# Patient Record
Sex: FEMALE | Race: WHITE | Employment: OTHER | URBAN - METROPOLITAN AREA
[De-identification: names, ages, dates, MRNs, and addresses within clinical notes are randomized per-mention and may not be internally consistent; named-entity substitution may affect disease eponyms.]

---

## 2022-06-22 ENCOUNTER — APPOINTMENT (OUTPATIENT)
Dept: GENERAL RADIOLOGY | Age: 77
DRG: 481 | End: 2022-06-22
Payer: MEDICARE

## 2022-06-22 ENCOUNTER — HOSPITAL ENCOUNTER (INPATIENT)
Age: 77
LOS: 5 days | Discharge: ANOTHER HOSPITAL | DRG: 481 | End: 2022-06-27
Attending: EMERGENCY MEDICINE | Admitting: INTERNAL MEDICINE
Payer: MEDICARE

## 2022-06-22 DIAGNOSIS — S72.002A CLOSED FRACTURE OF LEFT HIP, INITIAL ENCOUNTER (HCC): Primary | ICD-10-CM

## 2022-06-22 LAB
ABSOLUTE EOS #: 0.24 K/UL (ref 0–0.44)
ABSOLUTE IMMATURE GRANULOCYTE: 0.02 K/UL (ref 0–0.3)
ABSOLUTE LYMPH #: 2.71 K/UL (ref 1.1–3.7)
ABSOLUTE MONO #: 0.78 K/UL (ref 0.1–1.2)
ALBUMIN SERPL-MCNC: 4.3 G/DL (ref 3.5–5.2)
ALP BLD-CCNC: 32 U/L (ref 35–104)
ALT SERPL-CCNC: 8 U/L (ref 5–33)
ANION GAP SERPL CALCULATED.3IONS-SCNC: 15 MMOL/L (ref 9–17)
AST SERPL-CCNC: 16 U/L
BASOPHILS # BLD: 1 % (ref 0–2)
BASOPHILS ABSOLUTE: 0.05 K/UL (ref 0–0.2)
BILIRUB SERPL-MCNC: 0.22 MG/DL (ref 0.3–1.2)
BUN BLDV-MCNC: 23 MG/DL (ref 8–23)
BUN/CREAT BLD: 24 (ref 9–20)
CALCIUM SERPL-MCNC: 9.3 MG/DL (ref 8.6–10.4)
CHLORIDE BLD-SCNC: 97 MMOL/L (ref 98–107)
CO2: 19 MMOL/L (ref 20–31)
CREAT SERPL-MCNC: 0.97 MG/DL (ref 0.5–0.9)
EOSINOPHILS RELATIVE PERCENT: 3 % (ref 1–4)
GFR AFRICAN AMERICAN: >60 ML/MIN
GFR NON-AFRICAN AMERICAN: 55 ML/MIN
GFR SERPL CREATININE-BSD FRML MDRD: ABNORMAL ML/MIN/{1.73_M2}
GLUCOSE BLD-MCNC: 110 MG/DL (ref 70–99)
HCT VFR BLD CALC: 33.5 % (ref 36.3–47.1)
HEMOGLOBIN: 10.7 G/DL (ref 11.9–15.1)
IMMATURE GRANULOCYTES: 0 %
LYMPHOCYTES # BLD: 36 % (ref 24–43)
MCH RBC QN AUTO: 31.5 PG (ref 25.2–33.5)
MCHC RBC AUTO-ENTMCNC: 31.9 G/DL (ref 28.4–34.8)
MCV RBC AUTO: 98.5 FL (ref 82.6–102.9)
MONOCYTES # BLD: 10 % (ref 3–12)
NRBC AUTOMATED: 0 PER 100 WBC
PDW BLD-RTO: 13.3 % (ref 11.8–14.4)
PLATELET # BLD: 246 K/UL (ref 138–453)
PMV BLD AUTO: 8.9 FL (ref 8.1–13.5)
POTASSIUM SERPL-SCNC: 3.7 MMOL/L (ref 3.7–5.3)
RBC # BLD: 3.4 M/UL (ref 3.95–5.11)
SEG NEUTROPHILS: 50 % (ref 36–65)
SEGMENTED NEUTROPHILS ABSOLUTE COUNT: 3.7 K/UL (ref 1.5–8.1)
SODIUM BLD-SCNC: 131 MMOL/L (ref 135–144)
TOTAL PROTEIN: 6.7 G/DL (ref 6.4–8.3)
WBC # BLD: 7.5 K/UL (ref 3.5–11.3)

## 2022-06-22 PROCEDURE — 93005 ELECTROCARDIOGRAM TRACING: CPT | Performed by: EMERGENCY MEDICINE

## 2022-06-22 PROCEDURE — 80053 COMPREHEN METABOLIC PANEL: CPT

## 2022-06-22 PROCEDURE — 85610 PROTHROMBIN TIME: CPT

## 2022-06-22 PROCEDURE — 6360000002 HC RX W HCPCS: Performed by: EMERGENCY MEDICINE

## 2022-06-22 PROCEDURE — 73502 X-RAY EXAM HIP UNI 2-3 VIEWS: CPT

## 2022-06-22 PROCEDURE — 73552 X-RAY EXAM OF FEMUR 2/>: CPT

## 2022-06-22 PROCEDURE — 86850 RBC ANTIBODY SCREEN: CPT

## 2022-06-22 PROCEDURE — 99285 EMERGENCY DEPT VISIT HI MDM: CPT

## 2022-06-22 PROCEDURE — 86901 BLOOD TYPING SEROLOGIC RH(D): CPT

## 2022-06-22 PROCEDURE — 85730 THROMBOPLASTIN TIME PARTIAL: CPT

## 2022-06-22 PROCEDURE — 1200000000 HC SEMI PRIVATE

## 2022-06-22 PROCEDURE — 85025 COMPLETE CBC W/AUTO DIFF WBC: CPT

## 2022-06-22 PROCEDURE — 86900 BLOOD TYPING SEROLOGIC ABO: CPT

## 2022-06-22 PROCEDURE — 96374 THER/PROPH/DIAG INJ IV PUSH: CPT

## 2022-06-22 PROCEDURE — 71045 X-RAY EXAM CHEST 1 VIEW: CPT

## 2022-06-22 RX ORDER — MORPHINE SULFATE 2 MG/ML
2 INJECTION, SOLUTION INTRAMUSCULAR; INTRAVENOUS ONCE
Status: COMPLETED | OUTPATIENT
Start: 2022-06-22 | End: 2022-06-22

## 2022-06-22 RX ADMIN — MORPHINE SULFATE 2 MG: 2 INJECTION, SOLUTION INTRAMUSCULAR; INTRAVENOUS at 22:52

## 2022-06-22 ASSESSMENT — PAIN - FUNCTIONAL ASSESSMENT: PAIN_FUNCTIONAL_ASSESSMENT: 0-10

## 2022-06-22 ASSESSMENT — PAIN DESCRIPTION - LOCATION
LOCATION: HIP
LOCATION: HIP

## 2022-06-22 ASSESSMENT — PAIN SCALES - GENERAL
PAINLEVEL_OUTOF10: 10
PAINLEVEL_OUTOF10: 10

## 2022-06-22 ASSESSMENT — PAIN DESCRIPTION - ORIENTATION
ORIENTATION: LEFT
ORIENTATION: LEFT

## 2022-06-22 ASSESSMENT — ENCOUNTER SYMPTOMS: BACK PAIN: 0

## 2022-06-23 ENCOUNTER — ANESTHESIA EVENT (OUTPATIENT)
Dept: OPERATING ROOM | Age: 77
DRG: 481 | End: 2022-06-23
Payer: MEDICARE

## 2022-06-23 PROBLEM — E46 MALNUTRITION (HCC): Status: ACTIVE | Noted: 2018-05-23

## 2022-06-23 PROBLEM — T88.59XA NAUSEA AFTER ANESTHESIA: Status: ACTIVE | Noted: 2018-05-19

## 2022-06-23 PROBLEM — R11.0 NAUSEA AFTER ANESTHESIA: Status: ACTIVE | Noted: 2018-05-19

## 2022-06-23 PROBLEM — K57.30 DIVERTICULOSIS OF LARGE INTESTINE: Status: ACTIVE | Noted: 2018-05-19

## 2022-06-23 PROBLEM — F10.20 ALCOHOLISM (HCC): Status: ACTIVE | Noted: 2018-05-23

## 2022-06-23 PROBLEM — H54.7 VISION IMPAIRMENT: Status: ACTIVE | Noted: 2022-06-23

## 2022-06-23 PROBLEM — S32.009A CLOSED FRACTURE OF TRANSVERSE PROCESS OF LUMBAR VERTEBRA (HCC): Status: ACTIVE | Noted: 2021-10-04

## 2022-06-23 PROBLEM — I44.0 FIRST DEGREE AV BLOCK: Status: ACTIVE | Noted: 2022-06-23

## 2022-06-23 PROBLEM — E78.2 MIXED HYPERLIPIDEMIA: Status: ACTIVE | Noted: 2017-05-06

## 2022-06-23 PROBLEM — F32.A DEPRESSION: Status: ACTIVE | Noted: 2019-04-01

## 2022-06-23 PROBLEM — K65.1 INTRA-ABDOMINAL ABSCESS (HCC): Status: ACTIVE | Noted: 2017-01-16

## 2022-06-23 PROBLEM — D50.8 IRON DEFICIENCY ANEMIA SECONDARY TO INADEQUATE DIETARY IRON INTAKE: Status: ACTIVE | Noted: 2017-04-14

## 2022-06-23 PROBLEM — J44.9 COPD, MILD (HCC): Status: ACTIVE | Noted: 2017-05-06

## 2022-06-23 PROBLEM — I48.19 PERSISTENT ATRIAL FIBRILLATION (HCC): Status: ACTIVE | Noted: 2019-04-01

## 2022-06-23 PROBLEM — I67.9 CEREBROVASCULAR DISEASE: Status: ACTIVE | Noted: 2018-02-15

## 2022-06-23 PROBLEM — D51.9 B12 DEFICIENCY ANEMIA: Status: ACTIVE | Noted: 2017-05-11

## 2022-06-23 PROBLEM — Z79.01 CHRONIC ANTICOAGULATION: Status: ACTIVE | Noted: 2022-06-23

## 2022-06-23 PROBLEM — E87.1 HYPONATREMIA: Status: ACTIVE | Noted: 2017-04-30

## 2022-06-23 LAB
ABO/RH: NORMAL
ANION GAP SERPL CALCULATED.3IONS-SCNC: 11 MMOL/L (ref 9–17)
ANTIBODY SCREEN: NEGATIVE
ARM BAND NUMBER: NORMAL
BUN BLDV-MCNC: 16 MG/DL (ref 8–23)
BUN/CREAT BLD: 21 (ref 9–20)
CALCIUM SERPL-MCNC: 8.9 MG/DL (ref 8.6–10.4)
CHLORIDE BLD-SCNC: 99 MMOL/L (ref 98–107)
CO2: 21 MMOL/L (ref 20–31)
CREAT SERPL-MCNC: 0.76 MG/DL (ref 0.5–0.9)
EXPIRATION DATE: NORMAL
GFR AFRICAN AMERICAN: >60 ML/MIN
GFR NON-AFRICAN AMERICAN: >60 ML/MIN
GFR SERPL CREATININE-BSD FRML MDRD: ABNORMAL ML/MIN/{1.73_M2}
GLUCOSE BLD-MCNC: 113 MG/DL (ref 70–99)
INR BLD: 1.1
INR BLD: 1.2
PARTIAL THROMBOPLASTIN TIME: 30.7 SEC (ref 23.9–33.8)
POTASSIUM SERPL-SCNC: 4.3 MMOL/L (ref 3.7–5.3)
PROTHROMBIN TIME: 14.5 SEC (ref 11.5–14.2)
PROTHROMBIN TIME: 14.7 SEC (ref 11.5–14.2)
SODIUM BLD-SCNC: 131 MMOL/L (ref 135–144)

## 2022-06-23 PROCEDURE — 36415 COLL VENOUS BLD VENIPUNCTURE: CPT

## 2022-06-23 PROCEDURE — 6370000000 HC RX 637 (ALT 250 FOR IP): Performed by: NURSE PRACTITIONER

## 2022-06-23 PROCEDURE — 2580000003 HC RX 258: Performed by: NURSE PRACTITIONER

## 2022-06-23 PROCEDURE — 85610 PROTHROMBIN TIME: CPT

## 2022-06-23 PROCEDURE — 1200000000 HC SEMI PRIVATE

## 2022-06-23 PROCEDURE — 80048 BASIC METABOLIC PNL TOTAL CA: CPT

## 2022-06-23 PROCEDURE — 6360000002 HC RX W HCPCS: Performed by: NURSE PRACTITIONER

## 2022-06-23 PROCEDURE — 99222 1ST HOSP IP/OBS MODERATE 55: CPT | Performed by: INTERNAL MEDICINE

## 2022-06-23 PROCEDURE — APPSS45 APP SPLIT SHARED TIME 31-45 MINUTES: Performed by: NURSE PRACTITIONER

## 2022-06-23 RX ORDER — LORAZEPAM 2 MG/ML
2 INJECTION INTRAMUSCULAR
Status: DISCONTINUED | OUTPATIENT
Start: 2022-06-23 | End: 2022-06-23

## 2022-06-23 RX ORDER — APIXABAN 5 MG/1
5 TABLET, FILM COATED ORAL 2 TIMES DAILY
COMMUNITY
Start: 2022-05-30

## 2022-06-23 RX ORDER — LORAZEPAM 1 MG/1
3 TABLET ORAL
Status: DISCONTINUED | OUTPATIENT
Start: 2022-06-23 | End: 2022-06-23

## 2022-06-23 RX ORDER — ACETAMINOPHEN 500 MG
500-1000 TABLET ORAL EVERY 6 HOURS PRN
COMMUNITY

## 2022-06-23 RX ORDER — LORAZEPAM 2 MG/ML
3 INJECTION INTRAMUSCULAR
Status: DISCONTINUED | OUTPATIENT
Start: 2022-06-23 | End: 2022-06-23

## 2022-06-23 RX ORDER — ASPIRIN 81 MG/1
81 TABLET ORAL DAILY
COMMUNITY

## 2022-06-23 RX ORDER — MAGNESIUM SULFATE 1 G/100ML
1000 INJECTION INTRAVENOUS PRN
Status: DISCONTINUED | OUTPATIENT
Start: 2022-06-23 | End: 2022-06-27 | Stop reason: HOSPADM

## 2022-06-23 RX ORDER — PSEUDOEPHEDRINE HCL 30 MG
100 TABLET ORAL DAILY PRN
COMMUNITY

## 2022-06-23 RX ORDER — ACETAMINOPHEN 650 MG/1
650 SUPPOSITORY RECTAL EVERY 6 HOURS PRN
Status: DISCONTINUED | OUTPATIENT
Start: 2022-06-23 | End: 2022-06-27 | Stop reason: HOSPADM

## 2022-06-23 RX ORDER — DOCUSATE SODIUM 100 MG/1
100 CAPSULE, LIQUID FILLED ORAL 2 TIMES DAILY
Status: DISCONTINUED | OUTPATIENT
Start: 2022-06-23 | End: 2022-06-27 | Stop reason: HOSPADM

## 2022-06-23 RX ORDER — SODIUM CHLORIDE 9 MG/ML
INJECTION, SOLUTION INTRAVENOUS CONTINUOUS
Status: DISCONTINUED | OUTPATIENT
Start: 2022-06-23 | End: 2022-06-26

## 2022-06-23 RX ORDER — POLYETHYLENE GLYCOL 3350 17 G/17G
17 POWDER, FOR SOLUTION ORAL DAILY
Status: DISCONTINUED | OUTPATIENT
Start: 2022-06-23 | End: 2022-06-27 | Stop reason: HOSPADM

## 2022-06-23 RX ORDER — MORPHINE SULFATE 2 MG/ML
2 INJECTION, SOLUTION INTRAMUSCULAR; INTRAVENOUS ONCE
Status: DISCONTINUED | OUTPATIENT
Start: 2022-06-23 | End: 2022-06-24

## 2022-06-23 RX ORDER — FOLIC ACID 1 MG/1
1 TABLET ORAL DAILY
Status: DISCONTINUED | OUTPATIENT
Start: 2022-06-23 | End: 2022-06-27 | Stop reason: HOSPADM

## 2022-06-23 RX ORDER — MORPHINE SULFATE 2 MG/ML
2 INJECTION, SOLUTION INTRAMUSCULAR; INTRAVENOUS
Status: DISCONTINUED | OUTPATIENT
Start: 2022-06-23 | End: 2022-06-24

## 2022-06-23 RX ORDER — SODIUM CHLORIDE 0.9 % (FLUSH) 0.9 %
5-40 SYRINGE (ML) INJECTION EVERY 12 HOURS SCHEDULED
Status: DISCONTINUED | OUTPATIENT
Start: 2022-06-23 | End: 2022-06-23 | Stop reason: SDUPTHER

## 2022-06-23 RX ORDER — FENOFIBRATE 145 MG/1
145 TABLET, COATED ORAL DAILY
COMMUNITY
Start: 2022-05-13

## 2022-06-23 RX ORDER — POLYETHYLENE GLYCOL 3350 17 G/17G
17 POWDER, FOR SOLUTION ORAL DAILY PRN
Status: DISCONTINUED | OUTPATIENT
Start: 2022-06-23 | End: 2022-06-23

## 2022-06-23 RX ORDER — LORAZEPAM 1 MG/1
2 TABLET ORAL
Status: DISCONTINUED | OUTPATIENT
Start: 2022-06-23 | End: 2022-06-23

## 2022-06-23 RX ORDER — ALBUTEROL SULFATE 90 UG/1
2 AEROSOL, METERED RESPIRATORY (INHALATION) EVERY 6 HOURS PRN
COMMUNITY

## 2022-06-23 RX ORDER — HYDROCODONE BITARTRATE AND ACETAMINOPHEN 5; 325 MG/1; MG/1
1 TABLET ORAL EVERY 4 HOURS PRN
Status: DISCONTINUED | OUTPATIENT
Start: 2022-06-23 | End: 2022-06-24

## 2022-06-23 RX ORDER — ONDANSETRON 4 MG/1
4 TABLET, ORALLY DISINTEGRATING ORAL EVERY 8 HOURS PRN
Status: DISCONTINUED | OUTPATIENT
Start: 2022-06-23 | End: 2022-06-27 | Stop reason: HOSPADM

## 2022-06-23 RX ORDER — METOPROLOL SUCCINATE 50 MG/1
50 TABLET, EXTENDED RELEASE ORAL DAILY
COMMUNITY

## 2022-06-23 RX ORDER — OMEPRAZOLE 20 MG/1
20 CAPSULE, DELAYED RELEASE ORAL DAILY
COMMUNITY

## 2022-06-23 RX ORDER — SODIUM CHLORIDE 0.9 % (FLUSH) 0.9 %
10 SYRINGE (ML) INJECTION PRN
Status: DISCONTINUED | OUTPATIENT
Start: 2022-06-23 | End: 2022-06-23 | Stop reason: SDUPTHER

## 2022-06-23 RX ORDER — ENOXAPARIN SODIUM 100 MG/ML
40 INJECTION SUBCUTANEOUS DAILY
Status: DISCONTINUED | OUTPATIENT
Start: 2022-06-23 | End: 2022-06-23

## 2022-06-23 RX ORDER — LORAZEPAM 1 MG/1
4 TABLET ORAL
Status: DISCONTINUED | OUTPATIENT
Start: 2022-06-23 | End: 2022-06-23

## 2022-06-23 RX ORDER — GAUZE BANDAGE 2" X 2"
100 BANDAGE TOPICAL DAILY
Status: DISCONTINUED | OUTPATIENT
Start: 2022-06-23 | End: 2022-06-27 | Stop reason: HOSPADM

## 2022-06-23 RX ORDER — CYANOCOBALAMIN 1000 UG/ML
1000 INJECTION INTRAMUSCULAR; SUBCUTANEOUS
COMMUNITY
Start: 2022-03-25

## 2022-06-23 RX ORDER — SODIUM CHLORIDE 0.9 % (FLUSH) 0.9 %
5-40 SYRINGE (ML) INJECTION PRN
Status: DISCONTINUED | OUTPATIENT
Start: 2022-06-23 | End: 2022-06-24 | Stop reason: SDUPTHER

## 2022-06-23 RX ORDER — POTASSIUM CHLORIDE 20 MEQ/1
40 TABLET, EXTENDED RELEASE ORAL PRN
Status: DISCONTINUED | OUTPATIENT
Start: 2022-06-23 | End: 2022-06-27 | Stop reason: HOSPADM

## 2022-06-23 RX ORDER — SODIUM CHLORIDE 0.9 % (FLUSH) 0.9 %
5-40 SYRINGE (ML) INJECTION EVERY 12 HOURS SCHEDULED
Status: DISCONTINUED | OUTPATIENT
Start: 2022-06-23 | End: 2022-06-24 | Stop reason: SDUPTHER

## 2022-06-23 RX ORDER — LOVASTATIN 40 MG/1
40 TABLET ORAL NIGHTLY
COMMUNITY
Start: 2022-04-30

## 2022-06-23 RX ORDER — MORPHINE SULFATE 4 MG/ML
4 INJECTION, SOLUTION INTRAMUSCULAR; INTRAVENOUS
Status: DISCONTINUED | OUTPATIENT
Start: 2022-06-23 | End: 2022-06-24

## 2022-06-23 RX ORDER — POTASSIUM CHLORIDE 7.45 MG/ML
10 INJECTION INTRAVENOUS PRN
Status: DISCONTINUED | OUTPATIENT
Start: 2022-06-23 | End: 2022-06-27 | Stop reason: HOSPADM

## 2022-06-23 RX ORDER — SODIUM CHLORIDE 9 MG/ML
INJECTION, SOLUTION INTRAVENOUS PRN
Status: DISCONTINUED | OUTPATIENT
Start: 2022-06-23 | End: 2022-06-23 | Stop reason: SDUPTHER

## 2022-06-23 RX ORDER — SODIUM CHLORIDE 9 MG/ML
INJECTION, SOLUTION INTRAVENOUS PRN
Status: DISCONTINUED | OUTPATIENT
Start: 2022-06-23 | End: 2022-06-24 | Stop reason: SDUPTHER

## 2022-06-23 RX ORDER — LORAZEPAM 2 MG/ML
4 INJECTION INTRAMUSCULAR
Status: DISCONTINUED | OUTPATIENT
Start: 2022-06-23 | End: 2022-06-23

## 2022-06-23 RX ORDER — LORAZEPAM 1 MG/1
1 TABLET ORAL
Status: DISCONTINUED | OUTPATIENT
Start: 2022-06-23 | End: 2022-06-23

## 2022-06-23 RX ORDER — CITALOPRAM 20 MG/1
20 TABLET ORAL NIGHTLY
COMMUNITY
Start: 2022-05-30

## 2022-06-23 RX ORDER — LORAZEPAM 2 MG/ML
1 INJECTION INTRAMUSCULAR
Status: DISCONTINUED | OUTPATIENT
Start: 2022-06-23 | End: 2022-06-23

## 2022-06-23 RX ORDER — ONDANSETRON 2 MG/ML
4 INJECTION INTRAMUSCULAR; INTRAVENOUS EVERY 6 HOURS PRN
Status: DISCONTINUED | OUTPATIENT
Start: 2022-06-23 | End: 2022-06-27 | Stop reason: HOSPADM

## 2022-06-23 RX ORDER — ACETAMINOPHEN 325 MG/1
650 TABLET ORAL EVERY 6 HOURS PRN
Status: DISCONTINUED | OUTPATIENT
Start: 2022-06-23 | End: 2022-06-27 | Stop reason: HOSPADM

## 2022-06-23 RX ORDER — MAGNESIUM OXIDE 400 MG/1
400 TABLET ORAL DAILY
COMMUNITY

## 2022-06-23 RX ADMIN — HYDROCODONE BITARTRATE AND ACETAMINOPHEN 1 TABLET: 5; 325 TABLET ORAL at 08:22

## 2022-06-23 RX ADMIN — HYDROCODONE BITARTRATE AND ACETAMINOPHEN 1 TABLET: 5; 325 TABLET ORAL at 23:10

## 2022-06-23 RX ADMIN — MORPHINE SULFATE 2 MG: 2 INJECTION, SOLUTION INTRAMUSCULAR; INTRAVENOUS at 10:22

## 2022-06-23 RX ADMIN — SODIUM CHLORIDE: 9 INJECTION, SOLUTION INTRAVENOUS at 13:22

## 2022-06-23 RX ADMIN — MORPHINE SULFATE 2 MG: 2 INJECTION, SOLUTION INTRAMUSCULAR; INTRAVENOUS at 01:19

## 2022-06-23 RX ADMIN — MORPHINE SULFATE 4 MG: 4 INJECTION, SOLUTION INTRAMUSCULAR; INTRAVENOUS at 20:06

## 2022-06-23 RX ADMIN — DOCUSATE SODIUM 100 MG: 100 CAPSULE, LIQUID FILLED ORAL at 20:07

## 2022-06-23 RX ADMIN — HYDROCODONE BITARTRATE AND ACETAMINOPHEN 1 TABLET: 5; 325 TABLET ORAL at 18:08

## 2022-06-23 RX ADMIN — SODIUM CHLORIDE: 9 INJECTION, SOLUTION INTRAVENOUS at 01:19

## 2022-06-23 RX ADMIN — MORPHINE SULFATE 2 MG: 2 INJECTION, SOLUTION INTRAMUSCULAR; INTRAVENOUS at 05:36

## 2022-06-23 RX ADMIN — HYDROCODONE BITARTRATE AND ACETAMINOPHEN 1 TABLET: 5; 325 TABLET ORAL at 03:50

## 2022-06-23 ASSESSMENT — ENCOUNTER SYMPTOMS
BACK PAIN: 0
EYES NEGATIVE: 1
RESPIRATORY NEGATIVE: 1
GASTROINTESTINAL NEGATIVE: 1

## 2022-06-23 ASSESSMENT — PAIN DESCRIPTION - DESCRIPTORS
DESCRIPTORS: SHARP;DISCOMFORT;ACHING
DESCRIPTORS: ACHING;DISCOMFORT

## 2022-06-23 ASSESSMENT — PAIN SCALES - GENERAL
PAINLEVEL_OUTOF10: 10
PAINLEVEL_OUTOF10: 6
PAINLEVEL_OUTOF10: 7
PAINLEVEL_OUTOF10: 7
PAINLEVEL_OUTOF10: 10
PAINLEVEL_OUTOF10: 10
PAINLEVEL_OUTOF10: 5
PAINLEVEL_OUTOF10: 6
PAINLEVEL_OUTOF10: 7

## 2022-06-23 ASSESSMENT — PAIN DESCRIPTION - FREQUENCY
FREQUENCY: CONTINUOUS
FREQUENCY: CONTINUOUS

## 2022-06-23 ASSESSMENT — PAIN - FUNCTIONAL ASSESSMENT: PAIN_FUNCTIONAL_ASSESSMENT: PREVENTS OR INTERFERES WITH MANY ACTIVE NOT PASSIVE ACTIVITIES

## 2022-06-23 ASSESSMENT — PAIN DESCRIPTION - ONSET
ONSET: ON-GOING
ONSET: ON-GOING

## 2022-06-23 ASSESSMENT — PAIN DESCRIPTION - LOCATION
LOCATION: HIP

## 2022-06-23 ASSESSMENT — PAIN DESCRIPTION - ORIENTATION
ORIENTATION: LEFT

## 2022-06-23 ASSESSMENT — PAIN DESCRIPTION - PAIN TYPE
TYPE: ACUTE PAIN
TYPE: ACUTE PAIN

## 2022-06-23 NOTE — FLOWSHEET NOTE
Patient is very pleasant as she reclines in the bed. Patient engages in conversation and shares about her fall that led to a fractured hip. Patient states that she is in some discomfort and is awaiting surgery. Patient states that she has a daughter for support but the daughter is out of town at the moment. Patient seems to be coping adequately and does not acknowledge any spiritual or emotional concerns. Writer leaves a prayer card and business care for possible further follow. Patient is very appreciative of the visit. Spiritual Care will follow as needed. 06/23/22 1610   Encounter Summary   Service Provided For: Patient   Referral/Consult From: [facility] [place]   [facility]   Last Encounter  06/23/22   Complexity of Encounter Low   Begin Time 1515   End Time  1530   Total Time Calculated 15 min   Encounter    Type Initial Screen/Assessment   Assessment/Intervention/Outcome   Assessment Calm; Hopeful   Intervention Active listening;Explored/Affirmed feelings, thoughts, concerns;Explored Coping Skills/Resources;Nurtured Hope;Sustaining Presence/Ministry of presence

## 2022-06-23 NOTE — H&P
[facility]  Office: [address], DO, Mya Press, DO, Gayathri Kowalskiing, DO, Rafaelabram Gracefreda Blood, DO, Rocío Salmeron MD, Amy Pires MD, Miranda Maciel MD, Ada Loredo MD, Anabela Del Valle MD, Sadie Solo MD, Tarsha Huynh MD, Taylor Espinosa, DO, Tomi Samson MD,  Gena Siemens, DO, Quita Cheatham MD, Lakisha Medina MD, Angela Goodwin, DO, Koko Fraga MD, Cherelle Bauer MD, Antwon Cooper DO, Magalie Ya MD, Juice Glass MD, Erica Cruz [facility], [facility], CNP, Hayde Laurent, [facility], Xi Blocker, CNP, Markos Meyer, CNP, Shazia Baker, CNP, Roylene Goodell, PA-C, Tiffanie Ramon, [facility], Sumaya Vitale, CNP, Lynnette Leon, CNP, Mily Chambers, CNP, Marvin Flannery, CNS, Constantine Pollard, [facility], Kassie Headley, CNP, Viky Boyer, CNP, Vin Pro, [facility]    HISTORY AND PHYSICAL EXAMINATION            Date:   6/23/2022  Patient name:  Seda Walters  Date of admission:  6/22/2022 10:28 PM  MRN:   [id number]  Account:  [de-identified]  YOB: 1945  PCP:    No primary care provider on file. Room:   [facility] ABIEL/ABIEL  Code Status:    Full Code    Chief Complaint:     Chief Complaint   Patient presents with    Fall    Hip Pain       History Obtained From:     patient    History of Present Illness:     Seda Walters is a 76 y.o. Non- / non  female who presents with Fall and Hip Pain   and is admitted to the hospital for the management of Closed left hip fracture, initial encounter ([address] Patient reports she uses a walker and has for many years. She was standing in the small space when she twisted to move, lost her balance and fell onto her left side. She denies striking her head. Denies syncope or dizziness prior to falling. She has a PMH of HTN, atrial fib with Eliquis for anticoagulation, every other day ETOH consumption.      While in ED, xray of the left femur showed impacted, angulated intertrochanteric femur fracture. Orthopedic surgery was consulted and patient was admitted for further management of left hip fracture. Past Medical History:     Past Medical History:   Diagnosis Date    Atrial fibrillation ([address]     HTN (hypertension)         Past Surgical History:     Past Surgical History:   Procedure Laterality Date    APPENDECTOMY          Medications Prior to Admission:     Prior to Admission medications    Not on File        Allergies:     Sulfamethoxazole-trimethoprim    Social History:     Tobacco:    reports that she quit smoking about 39 years ago. She has never used smokeless tobacco.  Alcohol:      reports current alcohol use. Drug Use:  reports no history of drug use. Family History:     Family History   Problem Relation Age of Onset    Heart Disease Mother     Cancer Father     Hypertension Sister     Stroke Sister        Review of Systems:     Positive and Negative as described in HPI. Review of Systems   Constitutional: Negative. HENT: Negative. Eyes: Negative. Respiratory: Negative. Cardiovascular: Negative. Gastrointestinal: Negative. Genitourinary: Negative. Musculoskeletal: Positive for gait problem and myalgias. Negative for arthralgias and back pain. Skin: Negative. Neurological: Negative for dizziness, syncope, weakness, light-headedness, numbness and headaches. Psychiatric/Behavioral: Negative. Physical Exam:   BP (!) 131/55   Pulse 65   Temp 97.9 °F (36.6 °C) (Oral)   Resp 14   Ht 5' 6\" (1.676 m)   Wt 145 lb (65.8 kg)   SpO2 95%   BMI 23.40 kg/m²   Temp (24hrs), Av.9 °F (36.6 °C), Min:97.9 °F (36.6 °C), Max:97.9 °F (36.6 °C)    No results for input(s): POCGLU in the last 72 hours. No intake or output data in the 24 hours ending 22 0745    Physical Exam  Vitals and nursing note reviewed. Constitutional:       General: She is in acute distress.       Appearance: She is not ill-appearing, toxic-appearing or diaphoretic. HENT:      Head: Normocephalic and atraumatic. Right Ear: External ear normal.      Left Ear: External ear normal.      Nose: Nose normal. No rhinorrhea. Mouth/Throat:      Mouth: Mucous membranes are moist.   Eyes:      General: No scleral icterus. Right eye: No discharge. Left eye: No discharge. Extraocular Movements: Extraocular movements intact. Conjunctiva/sclera: Conjunctivae normal.      Pupils: Pupils are equal, round, and reactive to light. Neck:      Comments: No JVD  Cardiovascular:      Rate and Rhythm: Normal rate and regular rhythm. Pulses: Normal pulses. Heart sounds: Normal heart sounds. No murmur heard. No friction rub. No gallop. Pulmonary:      Effort: Pulmonary effort is normal. No respiratory distress. Breath sounds: Normal breath sounds. No wheezing, rhonchi or rales. Abdominal:      General: Bowel sounds are normal. There is no distension. Palpations: Abdomen is soft. Tenderness: There is no abdominal tenderness. There is no guarding. Hernia: No hernia is present. Musculoskeletal:      Cervical back: Normal range of motion and neck supple. Right lower leg: No edema. Left lower leg: No edema. Comments: Not able to move left leg, but able to wiggle toes on left    Skin:     General: Skin is warm and dry. Coloration: Skin is not jaundiced. Findings: No bruising, erythema or lesion. Neurological:      General: No focal deficit present. Mental Status: She is alert and oriented to person, place, and time. Psychiatric:         Mood and Affect: Mood normal.         Behavior: Behavior normal.         Thought Content:  Thought content normal.         Judgment: Judgment normal.         Investigations:      Laboratory Testing:  Recent Results (from the past 24 hour(s))   CBC with Auto Differential    Collection Time: 06/22/22 10:30 PM   Result Value Ref Range    WBC 7.5 3.5 - 11.3 k/uL    RBC 3.40 (L) 3.95 - 5.11 m/uL    Hemoglobin 10.7 (L) 11.9 - 15.1 g/dL    Hematocrit 33.5 (L) 36.3 - 47.1 %    MCV 98.5 82.6 - 102.9 fL    MCH 31.5 25.2 - 33.5 pg    MCHC 31.9 28.4 - 34.8 g/dL    RDW 13.3 11.8 - 14.4 %    Platelets 689 643 - 364 k/uL    MPV 8.9 8.1 - 13.5 fL    NRBC Automated 0.0 0.0 per 100 WBC    Seg Neutrophils 50 36 - 65 %    Lymphocytes 36 24 - 43 %    Monocytes 10 3 - 12 %    Eosinophils % 3 1 - 4 %    Basophils 1 0 - 2 %    Immature Granulocytes 0 0 %    Segs Absolute 3.70 1.50 - 8.10 k/uL    Absolute Lymph # 2.71 1.10 - 3.70 k/uL    Absolute Mono # 0.78 0.10 - 1.20 k/uL    Absolute Eos # 0.24 0.00 - 0.44 k/uL    Basophils Absolute 0.05 0.00 - 0.20 k/uL    Absolute Immature Granulocyte 0.02 0.00 - 0.30 k/uL   Comprehensive Metabolic Panel w/ Reflex to MG    Collection Time: 06/22/22 10:30 PM   Result Value Ref Range    Glucose 110 (H) 70 - 99 mg/dL    BUN 23 8 - 23 mg/dL    CREATININE 0.97 (H) 0.50 - 0.90 mg/dL    Bun/Cre Ratio 24 (H) 9 - 20    Calcium 9.3 8.6 - 10.4 mg/dL    Sodium 131 (L) 135 - 144 mmol/L    Potassium 3.7 3.7 - 5.3 mmol/L    Chloride 97 (L) 98 - 107 mmol/L    CO2 19 (L) 20 - 31 mmol/L    Anion Gap 15 9 - 17 mmol/L    Alkaline Phosphatase 32 (L) 35 - 104 U/L    ALT 8 5 - 33 U/L    AST 16 <32 U/L    Total Bilirubin 0.22 (L) 0.3 - 1.2 mg/dL    Total Protein 6.7 6.4 - 8.3 g/dL    Albumin 4.3 3.5 - 5.2 g/dL    GFR Non- 55 (L) >60 mL/min    GFR African American >60 >60 mL/min    GFR Comment         Protime-INR    Collection Time: 06/22/22 10:30 PM   Result Value Ref Range    Protime 14.7 (H) 11.5 - 14.2 sec    INR 1.2    APTT    Collection Time: 06/22/22 10:30 PM   Result Value Ref Range    PTT 30.7 23.9 - 33.8 sec   EKG 12 Lead    Collection Time: 06/22/22 10:55 PM   Result Value Ref Range    Ventricular Rate 65 BPM    Atrial Rate 65 BPM    P-R Interval 246 ms    QRS Duration 90 ms    Q-T Interval 450 ms    QTc Calculation (Bazett) 468 ms    P Axis 83 degrees    R Axis -23 degrees    T Axis 1 degrees   TYPE AND SCREEN    Collection Time: 06/22/22 11:00 PM   Result Value Ref Range    Expiration Date 06/25/2022,2359     Arm Band Number [id number]     ABO/Rh A POSITIVE     Antibody Screen NEGATIVE    Basic Metabolic Panel w/ Reflex to MG    Collection Time: 06/23/22  7:14 AM   Result Value Ref Range    Glucose 113 (H) 70 - 99 mg/dL    BUN 16 8 - 23 mg/dL    CREATININE 0.76 0.50 - 0.90 mg/dL    Bun/Cre Ratio 21 (H) 9 - 20    Calcium 8.9 8.6 - 10.4 mg/dL    Sodium 131 (L) 135 - 144 mmol/L    Potassium 4.3 3.7 - 5.3 mmol/L    Chloride 99 98 - 107 mmol/L    CO2 21 20 - 31 mmol/L    Anion Gap 11 9 - 17 mmol/L    GFR Non-African American >60 >60 mL/min    GFR African American >60 >60 mL/min    GFR Comment             Imaging/Diagnostics:  XR FEMUR LEFT (MIN 2 VIEWS)    Result Date: 6/23/2022  1. Redemonstration of impacted, angulated intertrochanteric femur fracture. 2.  Findings compatible with old trauma in the distal femoral metaphysis and prior surgery in the proximal tibia. XR CHEST 1 VIEW    Result Date: 6/23/2022  Chronic findings in the chest without acute airspace disease identified. XR HIP 2-3 VW W PELVIS LEFT    Result Date: 6/22/2022  Impacted and angulated intertrochanteric left femur fracture. Assessment :      Hospital Problems           Last Modified POA    * (Principal) Closed left hip fracture, initial encounter ([address] 6/23/2022 Yes    Alcoholism ([address] 6/23/2022 Yes    Overview Signed 6/23/2022  7:44 AM by WISAM Maurer NP     Last Assessment & Plan:   Formatting of this note might be different from the original.  Multivitamins   IV fluids         COPD, mild ([address] 6/23/2022 Yes    Essential hypertension 6/23/2022 Yes    Overview Signed 6/23/2022  7:44 AM by WISAM Maurer NP     Last Assessment & Plan:   Formatting of this note is different from the original.  Blood pressures are mostly well controlled.    Continue current medication regimen. Will monitor. BP Readings from Last 3 Encounters:   05/27/18 (!) 113/58   05/04/18 (!) 162/69   03/18/17 (!) 148/70         Mixed hyperlipidemia 6/23/2022 Yes    Overview Signed 6/23/2022  7:44 AM by WISAM Morales NP     Last Assessment & Plan:   Formatting of this note might be different from the original.  Continue home meds               Plan:     Patient status inpatient in the  Med/Surge    1. Left hip fracture: Orthopedic surgery consult. Hold Eliquis this AM. Pain control. IV hydration as ordered. NPO.  2. Alcoholism: CIWA. Encourage alcohol cessation or decreasing ETOH consumption. Thiamine & folic acid supp daily  3. COPD: supp O2 to keep SpO2 > 92%. IS.  4. HTN: Continue BP meds. Monitor VS as ordered. BP well controlled at this time. 5. Mixed HLD: further management as outpatient      Consultations:   [address] [place] [address] CONSULT TO INTERNAL MEDICINE     Patient is admitted as inpatient status because of co-morbidities listed above, severity of signs and symptoms as outlined, requirement for current medical therapies and most importantly because of direct risk to patient if care not provided in a hospital setting. Expected length of stay > 48 hours. WISAM Morales NP  6/23/2022  7:45 AM    Copy sent to Dr. Red Cao primary care provider on file.

## 2022-06-23 NOTE — PROGRESS NOTES
Physical Therapy  DATE: 2022    NAME: Sharonda Singh  MRN: [id number]   : 1945    Patient not seen this date for Physical Therapy due to:      [] Cancel by RN or physician due to:    [] Hemodialysis    [] Critical Lab Value Level     [] Blood transfusion in progress    [] Acute or unstable cardiovascular status   _MAP < 55 or more than >115  _HR < 40 or > 130    [] Acute or unstable pulmonary status   -FiO2 > 60%   _RR < 5 or >40    _O2 sats < 85%    [] Strict Bedrest    [] Off Unit for surgery or procedure    [] Off Unit for testing       [x] Pending ortho consult for acute intertochanteric femur fracture L hip    [] Refusal by Patient      [] Other      [] PT being discontinued at this time. Patient independent. No further needs. [] PT being discontinued at this time as the patient has been transferred to hospice care. No further needs.       [address], PT

## 2022-06-23 NOTE — ED PROVIDER NOTES
[address] [facility]  Emergency Medicine Department    Pt Name: Milan Ferguson  MRN: [id number]  [place] 1945  Date of evaluation: 6/22/2022  Provider: Joseph Sawant MD    CHIEF COMPLAINT     Chief Complaint   Patient presents with   Darlys Crome    Hip Pain       HISTORY OF PRESENT ILLNESS  (Location/Symptom, Timing/Onset, Context/Setting,Quality, Duration, Modifying Factors, Severity.)   Milan Ferguson is a 76 y.o. female who presents to the emergency department complaining of pain to the left hip. She was walking in her kitchen with her walker and fell. She is not sure how she fell. She denies hitting her head. She denies any back pain. She was unable to stand back up after she fell. She rates her pain as a 10 out of 10. No numbness in the left lower extremity. Nursing Notes were reviewed. ALLERGIES     Patient has no known allergies. CURRENT MEDICATIONS       Previous Medications    No medications on file       PAST MEDICAL HISTORY   History reviewed. No pertinent past medical history. SURGICAL HISTORY     History reviewed. No pertinent surgical history. FAMILY HISTORY     History reviewed. No pertinent family history. No family status information on file. SOCIAL HISTORY          REVIEW OF SYSTEMS    (2-9 systems for level 4, 10 or more for level 5)     Review of Systems   Cardiovascular: Negative for chest pain. Musculoskeletal: Negative for back pain and neck pain. Pain in left hip   Skin: Negative for wound. Allergic/Immunologic: Negative for immunocompromised state. Neurological: Negative for dizziness, numbness and headaches. All other systems reviewed and are negative.       PHYSICAL EXAM    (up to 7 for level 4, 8 or more for level 5)     ED Triage Vitals [06/22/22 2228]   BP Temp Temp Source Heart Rate Resp SpO2 Height Weight   128/70 97.9 °F (36.6 °C) Oral 65 16 98 % 5' 6\" (1.676 m) 145 lb (65.8 kg)       Physical Exam  Vitals and nursing note reviewed. Constitutional:       General: She is not in acute distress. Appearance: She is well-developed. HENT:      Head: Normocephalic and atraumatic. Nose: Nose normal.      Mouth/Throat:      Mouth: Mucous membranes are moist.   Eyes:      Extraocular Movements: Extraocular movements intact. Cardiovascular:      Rate and Rhythm: Normal rate and regular rhythm. Heart sounds: Normal heart sounds. Pulmonary:      Effort: Pulmonary effort is normal. No respiratory distress. Breath sounds: Normal breath sounds. Abdominal:      Palpations: Abdomen is soft. Tenderness: There is no abdominal tenderness. There is no guarding. Musculoskeletal:         General: Deformity (left lower extremity shortened and externally rotated) and signs of injury (left hip) present. No tenderness. Normal range of motion. Cervical back: Normal range of motion and neck supple. Lymphadenopathy:      Cervical: No cervical adenopathy. Skin:     General: Skin is warm and dry. Findings: No bruising. Neurological:      Mental Status: She is alert and oriented to person, place, and time. Psychiatric:         Behavior: Behavior normal.         Thought Content: Thought content normal.         Judgment: Judgment normal.         DIAGNOSTIC RESULTS     EKG:All EKG's are interpreted by the Emergency Department Physician who either signs or Co-signs this chart in the absence of a cardiologist.    Interpretation: Normal sinus rhythm with a rate of 65. First degree heart block. Normal Axis. Normal intervals. No ST elevations.     RADIOLOGY:   Non-plain film images such as CT, Ultrasound and MRI are read by theradiologist. Plain radiographic images are visualized and preliminarily interpreted by the emergency physician with the below findings:    Interpretation per the Radiologist below, if available at the time of this note:    XR HIP 2-3 VW W PELVIS LEFT    (Results Pending)   XR CHEST 1 VIEW (Results Pending)   XR FEMUR LEFT (MIN 2 VIEWS)    (Results Pending)       ED BEDSIDE ULTRASOUND:   Performed by ED Physician - none    LABS:  Labs Reviewed   CBC WITH AUTO DIFFERENTIAL - Abnormal; Notable for the following components:       Result Value    RBC 3.40 (*)     Hemoglobin 10.7 (*)     Hematocrit 33.5 (*)     All other components within normal limits   COMPREHENSIVE METABOLIC PANEL W/ REFLEX TO MG FOR LOW K - Abnormal; Notable for the following components:    Glucose 110 (*)     CREATININE 0.97 (*)     Bun/Cre Ratio 24 (*)     Sodium 131 (*)     Chloride 97 (*)     CO2 19 (*)     Alkaline Phosphatase 32 (*)     Total Bilirubin 0.22 (*)     GFR Non- 55 (*)     All other components within normal limits   PROTIME-INR   APTT   TYPE AND SCREEN       All other labs were within normal range or not returned as of this dictation. EMERGENCYDEPARTMENT COURSE and DIFFERENTIAL DIAGNOSIS/MDM:   Vitals:    Vitals:    06/22/22 2228   BP: 128/70   Pulse: 65   Resp: 16   Temp: 97.9 °F (36.6 °C)   TempSrc: Oral   SpO2: 98%   Weight: 145 lb (65.8 kg)   Height: 5' 6\" (1.676 m)     33-year-old female presenting after mechanical fall found to have a left hip fracture. Discussed with Dr. Kristel Magallon from orthopedics who recommends admission to the medicine service for medical preop evaluation. The patient's pain is well controlled with minimal IV pain medication. We will continue to monitor and treat her pain appropriately. CONSULTS:  IP CONSULT TO ORTHOPEDIC SURGERY  IP CONSULT TO INTERNAL MEDICINE    PROCEDURES:  None indicated    FINAL IMPRESSION     1.  Closed fracture of left hip, initial encounter [facility])          DISPOSITION/PLAN   DISPOSITION Admitted 06/22/2022 11:49:40 PM    (Please note that portions of this note were completed with a voice recognition program.  Efforts were made to edit the dictations butoccasionally words are mis-transcribed.)    Christal Whitehead MD  Attending Emergency Physician         Stu Gomez MD  06/22/22 0026

## 2022-06-23 NOTE — ED NOTES
ED to inpatient nurses report     Chief Complaint   Patient presents with    Fall    Hip Pain      Present to ED from home via EMS with s/p fall. Pt walks at home with a walker, pt states she had approximately 1/2 bottle of wine tonight and fell and landed on her left hip. LOC: alert and orientated to name, place, date  Vital signs   Vitals:    06/22/22 2228 06/22/22 2352 06/22/22 2354 06/23/22 0030   BP: 128/70 (!) 127/55  118/61   Pulse: 65      Resp: 16      Temp: 97.9 °F (36.6 °C)      TempSrc: Oral      SpO2: 98%  97% 95%   Weight: 145 lb (65.8 kg)      Height: 5' 6\" (1.676 m)         Oxygen Baseline 95-96% on RA. Current needs required ---   LDAs:   Peripheral IV 06/22/22 Left Forearm (Active)   Site Assessment Clean, dry & intact 06/22/22 2233   Line Status Blood return noted 06/22/22 2233   Phlebitis Assessment No symptoms 06/22/22 2233   Infiltration Assessment 0 06/22/22 2233   Dressing Status Clean, dry & intact 06/22/22 2233     Mobility: Requires assistance * 1  Fall Risk: [place] 1 Fall Risk  Presents to emergency department  because of falls (Syncope, seizure, or loss of consciousness): Yes (06/22/22 2230)  Age > 79: Yes (06/22/22 2230)  Altered Mental Status, Intoxication with alcohol or substance confusion (Disorientation, impaired judgment, poor safety awaremess, or inability to follow instructions): Yes (06/22/22 2230)  Impaired Mobility: Ambulates or transfers with assistive devices or assistance;  Unable to ambulate or transer.: Yes (06/22/22 2230)  Nursing Judgement: Yes (06/22/22 2230)  Pending ED orders: ---  Present condition: stable  Code Status: FULL   Consults: IP CONSULT TO ORTHOPEDIC SURGERY  IP CONSULT TO INTERNAL MEDICINE  [x]  Hospitalist  Completed  [x] yes [] no Who: Inter Med  []  Medicine  Completed  [] yes [] No Who:   []  Cardiology  Completed  [] yes [] No Who:   []  GI   Completed  [] yes [] No Who:   []  Neurology  Completed  [] yes [] No Who:   []  Nephrology Completed [] yes [] No Who:    []  Vascular  Completed  [] yes [] No Who:   [x]  Ortho  Completed  [x] yes [] No Who: Robert    []  Surgery  Completed  [] yes [] No Who:    []  Urology  Completed  [] yes [] No Who:    []  CT Surgery Completed  [] yes [] No Who:   []  Podiatry  Completed  [] yes [] No Who:    []  Other    Completed  [] yes [] No Who:    Interventions: 2mg Morphine IV x3, Normal Saline @ 75mL/hr, Norco 5-325mg PO   Important Events:         Electronically signed by Abbie Jackson RN on 6/23/2022 at 12:51 AM     Abbie Jackson RN  06/23/22 0543

## 2022-06-23 NOTE — PLAN OF CARE
Problem: Discharge Planning  Goal: Discharge to home or other facility with appropriate resources  Outcome: Progressing  Flowsheets  Taken 6/23/2022 0828  Discharge to home or other facility with appropriate resources: Refer to discharge planning if patient needs post-hospital services based on physician order or complex needs related to functional status, cognitive ability or social support system  Taken 6/23/2022 0822  Discharge to home or other facility with appropriate resources: Refer to discharge planning if patient needs post-hospital services based on physician order or complex needs related to functional status, cognitive ability or social support system     Problem: Pain  Goal: Verbalizes/displays adequate comfort level or baseline comfort level  Outcome: Progressing     Problem: Skin/Tissue Integrity  Goal: Absence of new skin breakdown  Description: 1. Monitor for areas of redness and/or skin breakdown  2. Assess vascular access sites hourly  3. Every 4-6 hours minimum:  Change oxygen saturation probe site  4. Every 4-6 hours:  If on nasal continuous positive airway pressure, respiratory therapy assess nares and determine need for appliance change or resting period.   Outcome: Progressing     Problem: Safety - Adult  Goal: Free from fall injury  Outcome: Progressing  Flowsheets (Taken 6/23/2022 0825)  Free From Fall Injury: Based on caregiver fall risk screen, instruct family/caregiver to ask for assistance with transferring infant if caregiver noted to have fall risk factors     Problem: ABCDS Injury Assessment  Goal: Absence of physical injury  Outcome: Progressing  Flowsheets (Taken 6/23/2022 0825)  Absence of Physical Injury: Implement safety measures based on patient assessment

## 2022-06-23 NOTE — CARE COORDINATION
Case Management Initial Discharge Plan  [facility],         Readmission Risk              Risk of Unplanned Readmission:  15             Met with:patient to discuss discharge plans. Information verified: address, contacts, phone number, , insurance Yes  PCP: No primary care provider on file. Date of last visit: 2 wks ago (was seeing Dr Elmer Man but he is retiring)     Insurance Provider: Medicare     Discharge Planning  Current Residence: Home  Living Arrangements:  Alone   Home has 1 story/0 stairs to climb  Support Systems:  [place] (Comment) (grandchildren)  Current Services PTA:    Supplier: n/a  Patient able to perform ADL's:Assisted  DME used to aid ambulation prior to admission: walker /during admission wheelchair    Potential Assistance Needed:  [address]: [employer]    Potential Assistance Purchasing Medications:  No  Does patient want to participate in local refill/ meds to beds program?  No    Patient agreeable to home care: Yes  Freedom of choice provided:  n/a      Type of Home Care Services:  None  Patient expects to be discharged to:       Prior SNF/Rehab Placement and Facility: [address] to SNF/Rehab: Yes  [place] of choice provided: yes   Evaluation: n/a    Expected Discharge date: Follow Up Appointment: Best Day/ Time: Wednesday AM    Transportation provider: EMS  Transportation arrangements needed for discharge: Yes      Discharge Plan:    met with patient at bedside. Patient reports that she fell and fractured her left hip. Likely going to have surgery. Would like to go to a SNF or have [facility] at discharge. Has been to [facility] [place] in the past, but would like to go to somewhere closer to her daughter. Daughter would like [facility] and [facility] [place] as second choice. Patient has a walker and grab bars at home. Reports that she has a ramp.    Daughter is out of town and will be gone until Monday. Would like to have transport set up.          Electronically signed by SU Underwood on 6/23/22 at 12:27 PM EDT

## 2022-06-23 NOTE — PROGRESS NOTES
Transitions of Care Pharmacy Service   Medication Review    The patient's list of current home medications was reviewed and updated. Source(s) of information: patient, Care Everywhere, Surescripts refill report      PROVIDER ACTION REQUESTED  Medications that need to be addressed by a physician/nurse practitioner:    Medication Action Requested        Home med list is ready for physician review         Please feel free to call me with any questions about this encounter. Thank you.     Rayna Cuenca [address] [place] [address]   Transitions of Care Pharmacy Service  Phone:  [phone]  Fax: [phone]      Electronically signed by Rayna Cuenca [address] [place] [address] on 6/23/2022 at 11:50 AM           Medications Prior to Admission:   cyanocobalamin 1000 MCG/ML injection, Inject 1,000 mcg into the muscle every 30 days Indications: on the 6th of the month   fenofibrate (TRICOR) 145 MG tablet, Take 145 mg by mouth daily  omeprazole (PRILOSEC) 20 MG delayed release capsule, Take 20 mg by mouth daily  metoprolol succinate (TOPROL XL) 50 MG extended release tablet, Take 50 mg by mouth daily  albuterol sulfate HFA (VENTOLIN HFA) 108 (90 Base) MCG/ACT inhaler, Inhale 2 puffs into the lungs every 6 hours as needed for Wheezing or Shortness of Breath  acetaminophen (TYLENOL) 500 MG tablet, Take 500-1,000 mg by mouth every 6 hours as needed for Pain  magnesium oxide (MAG-OX) 400 MG tablet, Take 400 mg by mouth daily  ELIQUIS 5 MG TABS tablet, Take 5 mg by mouth 2 times daily   aspirin 81 MG EC tablet, Take 81 mg by mouth daily  citalopram (CELEXA) 20 MG tablet, Take 20 mg by mouth nightly   docusate (COLACE, DULCOLAX) 100 MG CAPS, Take 100 mg by mouth daily as needed (constipation)   lovastatin (MEVACOR) 40 MG tablet, Take 40 mg by mouth nightly

## 2022-06-23 NOTE — PROGRESS NOTES
Pt admitted to room 2107 per bed in stable condition from ER  Oriented to room and surroundings  Bed in lowest position, wheels locked, 2/4 side rails up  Call light in reach, room free of clutter, adequate lighting provided  Denies any further questions at this time  Instructed to call out with any questions/concerns/new onset of pain and/or n/v   White board updated  Continue to monitor with hourly rounding  STAY WITH ME protocol initiated   Bed alarm on/Fall Risk signs in place/Fall risk sticker to wrist band  Non-skid socks on/at bedside

## 2022-06-23 NOTE — CARE COORDINATION
Referrals sent to [facility] and [facility] [place].

## 2022-06-24 ENCOUNTER — ANESTHESIA (OUTPATIENT)
Dept: OPERATING ROOM | Age: 77
DRG: 481 | End: 2022-06-24
Payer: MEDICARE

## 2022-06-24 ENCOUNTER — APPOINTMENT (OUTPATIENT)
Dept: GENERAL RADIOLOGY | Age: 77
DRG: 481 | End: 2022-06-24
Payer: MEDICARE

## 2022-06-24 LAB
ABSOLUTE EOS #: 0.21 K/UL (ref 0–0.44)
ABSOLUTE IMMATURE GRANULOCYTE: 0.02 K/UL (ref 0–0.3)
ABSOLUTE LYMPH #: 1.36 K/UL (ref 1.1–3.7)
ABSOLUTE MONO #: 0.62 K/UL (ref 0.1–1.2)
ANION GAP SERPL CALCULATED.3IONS-SCNC: 9 MMOL/L (ref 9–17)
BASOPHILS # BLD: 1 % (ref 0–2)
BASOPHILS ABSOLUTE: 0.03 K/UL (ref 0–0.2)
BUN BLDV-MCNC: 11 MG/DL (ref 8–23)
BUN/CREAT BLD: 15 (ref 9–20)
CALCIUM SERPL-MCNC: 8.8 MG/DL (ref 8.6–10.4)
CHLORIDE BLD-SCNC: 104 MMOL/L (ref 98–107)
CO2: 21 MMOL/L (ref 20–31)
CREAT SERPL-MCNC: 0.74 MG/DL (ref 0.5–0.9)
EKG ATRIAL RATE: 65 BPM
EKG P AXIS: 83 DEGREES
EKG P-R INTERVAL: 246 MS
EKG Q-T INTERVAL: 450 MS
EKG QRS DURATION: 90 MS
EKG QTC CALCULATION (BAZETT): 468 MS
EKG R AXIS: -23 DEGREES
EKG T AXIS: 1 DEGREES
EKG VENTRICULAR RATE: 65 BPM
EOSINOPHILS RELATIVE PERCENT: 4 % (ref 1–4)
FERRITIN: 128 NG/ML (ref 13–150)
FOLATE: 14.7 NG/ML
GFR AFRICAN AMERICAN: >60 ML/MIN
GFR NON-AFRICAN AMERICAN: >60 ML/MIN
GFR SERPL CREATININE-BSD FRML MDRD: ABNORMAL ML/MIN/{1.73_M2}
GLUCOSE BLD-MCNC: 115 MG/DL (ref 70–99)
HCT VFR BLD CALC: 28.6 % (ref 36.3–47.1)
HEMOGLOBIN: 9.3 G/DL (ref 11.9–15.1)
IMMATURE GRANULOCYTES: 0 %
IRON SATURATION: 18 % (ref 20–55)
IRON: 61 UG/DL (ref 37–145)
LYMPHOCYTES # BLD: 25 % (ref 24–43)
MCH RBC QN AUTO: 31.3 PG (ref 25.2–33.5)
MCHC RBC AUTO-ENTMCNC: 32.5 G/DL (ref 28.4–34.8)
MCV RBC AUTO: 96.3 FL (ref 82.6–102.9)
MONOCYTES # BLD: 12 % (ref 3–12)
NRBC AUTOMATED: 0 PER 100 WBC
PDW BLD-RTO: 13.2 % (ref 11.8–14.4)
PLATELET # BLD: 188 K/UL (ref 138–453)
PMV BLD AUTO: 8.2 FL (ref 8.1–13.5)
POTASSIUM SERPL-SCNC: 4.4 MMOL/L (ref 3.7–5.3)
RBC # BLD: 2.97 M/UL (ref 3.95–5.11)
SEG NEUTROPHILS: 58 % (ref 36–65)
SEGMENTED NEUTROPHILS ABSOLUTE COUNT: 3.16 K/UL (ref 1.5–8.1)
SODIUM BLD-SCNC: 134 MMOL/L (ref 135–144)
TOTAL IRON BINDING CAPACITY: 348 UG/DL (ref 250–450)
UNSATURATED IRON BINDING CAPACITY: 287 UG/DL (ref 112–347)
VITAMIN B-12: 420 PG/ML (ref 232–1245)
WBC # BLD: 5.4 K/UL (ref 3.5–11.3)

## 2022-06-24 PROCEDURE — 6370000000 HC RX 637 (ALT 250 FOR IP): Performed by: ORTHOPAEDIC SURGERY

## 2022-06-24 PROCEDURE — C1713 ANCHOR/SCREW BN/BN,TIS/BN: HCPCS | Performed by: ORTHOPAEDIC SURGERY

## 2022-06-24 PROCEDURE — 2580000003 HC RX 258: Performed by: ORTHOPAEDIC SURGERY

## 2022-06-24 PROCEDURE — 6360000002 HC RX W HCPCS: Performed by: NURSE PRACTITIONER

## 2022-06-24 PROCEDURE — 0QS704Z REPOSITION LEFT UPPER FEMUR WITH INTERNAL FIXATION DEVICE, OPEN APPROACH: ICD-10-PCS | Performed by: ORTHOPAEDIC SURGERY

## 2022-06-24 PROCEDURE — 82607 VITAMIN B-12: CPT

## 2022-06-24 PROCEDURE — 3600000003 HC SURGERY LEVEL 3 BASE: Performed by: ORTHOPAEDIC SURGERY

## 2022-06-24 PROCEDURE — 6360000002 HC RX W HCPCS: Performed by: ORTHOPAEDIC SURGERY

## 2022-06-24 PROCEDURE — 7100000001 HC PACU RECOVERY - ADDTL 15 MIN: Performed by: ORTHOPAEDIC SURGERY

## 2022-06-24 PROCEDURE — 3209999900 FLUORO FOR SURGICAL PROCEDURES

## 2022-06-24 PROCEDURE — 6360000002 HC RX W HCPCS: Performed by: NURSE ANESTHETIST, CERTIFIED REGISTERED

## 2022-06-24 PROCEDURE — 7100000000 HC PACU RECOVERY - FIRST 15 MIN: Performed by: ORTHOPAEDIC SURGERY

## 2022-06-24 PROCEDURE — 2580000003 HC RX 258: Performed by: NURSE PRACTITIONER

## 2022-06-24 PROCEDURE — 3700000001 HC ADD 15 MINUTES (ANESTHESIA): Performed by: ORTHOPAEDIC SURGERY

## 2022-06-24 PROCEDURE — C1769 GUIDE WIRE: HCPCS | Performed by: ORTHOPAEDIC SURGERY

## 2022-06-24 PROCEDURE — 2500000003 HC RX 250 WO HCPCS: Performed by: NURSE ANESTHETIST, CERTIFIED REGISTERED

## 2022-06-24 PROCEDURE — 1200000000 HC SEMI PRIVATE

## 2022-06-24 PROCEDURE — 93010 ELECTROCARDIOGRAM REPORT: CPT | Performed by: INTERNAL MEDICINE

## 2022-06-24 PROCEDURE — 73502 X-RAY EXAM HIP UNI 2-3 VIEWS: CPT

## 2022-06-24 PROCEDURE — 3700000000 HC ANESTHESIA ATTENDED CARE: Performed by: ORTHOPAEDIC SURGERY

## 2022-06-24 PROCEDURE — 36415 COLL VENOUS BLD VENIPUNCTURE: CPT

## 2022-06-24 PROCEDURE — 2709999900 HC NON-CHARGEABLE SUPPLY: Performed by: ORTHOPAEDIC SURGERY

## 2022-06-24 PROCEDURE — 83550 IRON BINDING TEST: CPT

## 2022-06-24 PROCEDURE — 83540 ASSAY OF IRON: CPT

## 2022-06-24 PROCEDURE — 27245 TREAT THIGH FRACTURE: CPT | Performed by: ORTHOPAEDIC SURGERY

## 2022-06-24 PROCEDURE — 2720000010 HC SURG SUPPLY STERILE: Performed by: ORTHOPAEDIC SURGERY

## 2022-06-24 PROCEDURE — 82746 ASSAY OF FOLIC ACID SERUM: CPT

## 2022-06-24 PROCEDURE — 3600000013 HC SURGERY LEVEL 3 ADDTL 15MIN: Performed by: ORTHOPAEDIC SURGERY

## 2022-06-24 PROCEDURE — 82728 ASSAY OF FERRITIN: CPT

## 2022-06-24 PROCEDURE — 80048 BASIC METABOLIC PNL TOTAL CA: CPT

## 2022-06-24 PROCEDURE — 99222 1ST HOSP IP/OBS MODERATE 55: CPT | Performed by: ORTHOPAEDIC SURGERY

## 2022-06-24 PROCEDURE — 85025 COMPLETE CBC W/AUTO DIFF WBC: CPT

## 2022-06-24 PROCEDURE — 2580000003 HC RX 258: Performed by: NURSE ANESTHETIST, CERTIFIED REGISTERED

## 2022-06-24 PROCEDURE — 99232 SBSQ HOSP IP/OBS MODERATE 35: CPT | Performed by: INTERNAL MEDICINE

## 2022-06-24 DEVICE — SCREW BNE L34MM DIA5MM TIB LT GRN TI ST CANN LOK FULL THRD: Type: IMPLANTABLE DEVICE | Site: HIP | Status: FUNCTIONAL

## 2022-06-24 DEVICE — SCREW BNE L85MM DIA10.35MM G TI CANN PERF FOR PROX FEM: Type: IMPLANTABLE DEVICE | Site: HIP | Status: FUNCTIONAL

## 2022-06-24 DEVICE — NAIL IM L170MM DIA10MM NK 125DEG SHT PROX FEM GRN TI-15MO: Type: IMPLANTABLE DEVICE | Site: HIP | Status: FUNCTIONAL

## 2022-06-24 RX ORDER — GINSENG 100 MG
CAPSULE ORAL PRN
Status: DISCONTINUED | OUTPATIENT
Start: 2022-06-24 | End: 2022-06-24 | Stop reason: ALTCHOICE

## 2022-06-24 RX ORDER — ROCURONIUM BROMIDE 10 MG/ML
INJECTION, SOLUTION INTRAVENOUS PRN
Status: DISCONTINUED | OUTPATIENT
Start: 2022-06-24 | End: 2022-06-24 | Stop reason: SDUPTHER

## 2022-06-24 RX ORDER — PROPOFOL 10 MG/ML
INJECTION, EMULSION INTRAVENOUS PRN
Status: DISCONTINUED | OUTPATIENT
Start: 2022-06-24 | End: 2022-06-24 | Stop reason: SDUPTHER

## 2022-06-24 RX ORDER — SODIUM CHLORIDE 0.9 % (FLUSH) 0.9 %
5-40 SYRINGE (ML) INJECTION EVERY 12 HOURS SCHEDULED
Status: DISCONTINUED | OUTPATIENT
Start: 2022-06-24 | End: 2022-06-24 | Stop reason: HOSPADM

## 2022-06-24 RX ORDER — SODIUM CHLORIDE 0.9 % (FLUSH) 0.9 %
5-40 SYRINGE (ML) INJECTION PRN
Status: DISCONTINUED | OUTPATIENT
Start: 2022-06-24 | End: 2022-06-24 | Stop reason: HOSPADM

## 2022-06-24 RX ORDER — FENTANYL CITRATE 50 UG/ML
INJECTION, SOLUTION INTRAMUSCULAR; INTRAVENOUS PRN
Status: DISCONTINUED | OUTPATIENT
Start: 2022-06-24 | End: 2022-06-24 | Stop reason: SDUPTHER

## 2022-06-24 RX ORDER — ENOXAPARIN SODIUM 100 MG/ML
40 INJECTION SUBCUTANEOUS DAILY
Status: CANCELLED | OUTPATIENT
Start: 2022-06-24

## 2022-06-24 RX ORDER — HYDROCODONE BITARTRATE AND ACETAMINOPHEN 5; 325 MG/1; MG/1
1 TABLET ORAL EVERY 6 HOURS PRN
Status: DISCONTINUED | OUTPATIENT
Start: 2022-06-24 | End: 2022-06-25

## 2022-06-24 RX ORDER — SODIUM CHLORIDE, SODIUM LACTATE, POTASSIUM CHLORIDE, CALCIUM CHLORIDE 600; 310; 30; 20 MG/100ML; MG/100ML; MG/100ML; MG/100ML
INJECTION, SOLUTION INTRAVENOUS CONTINUOUS PRN
Status: DISCONTINUED | OUTPATIENT
Start: 2022-06-24 | End: 2022-06-24 | Stop reason: SDUPTHER

## 2022-06-24 RX ORDER — SODIUM CHLORIDE 9 MG/ML
INJECTION, SOLUTION INTRAVENOUS PRN
Status: DISCONTINUED | OUTPATIENT
Start: 2022-06-24 | End: 2022-06-24 | Stop reason: HOSPADM

## 2022-06-24 RX ORDER — ONDANSETRON 2 MG/ML
4 INJECTION INTRAMUSCULAR; INTRAVENOUS
Status: DISCONTINUED | OUTPATIENT
Start: 2022-06-24 | End: 2022-06-24 | Stop reason: HOSPADM

## 2022-06-24 RX ORDER — PHENYLEPHRINE HCL IN 0.9% NACL 1 MG/10 ML
SYRINGE (ML) INTRAVENOUS PRN
Status: DISCONTINUED | OUTPATIENT
Start: 2022-06-24 | End: 2022-06-24 | Stop reason: SDUPTHER

## 2022-06-24 RX ORDER — ONDANSETRON 2 MG/ML
INJECTION INTRAMUSCULAR; INTRAVENOUS PRN
Status: DISCONTINUED | OUTPATIENT
Start: 2022-06-24 | End: 2022-06-24 | Stop reason: SDUPTHER

## 2022-06-24 RX ORDER — LIDOCAINE HYDROCHLORIDE 20 MG/ML
INJECTION, SOLUTION EPIDURAL; INFILTRATION; INTRACAUDAL; PERINEURAL PRN
Status: DISCONTINUED | OUTPATIENT
Start: 2022-06-24 | End: 2022-06-24 | Stop reason: SDUPTHER

## 2022-06-24 RX ORDER — HYDROMORPHONE HYDROCHLORIDE 1 MG/ML
0.25 INJECTION, SOLUTION INTRAMUSCULAR; INTRAVENOUS; SUBCUTANEOUS EVERY 5 MIN PRN
Status: DISCONTINUED | OUTPATIENT
Start: 2022-06-24 | End: 2022-06-24 | Stop reason: HOSPADM

## 2022-06-24 RX ORDER — CEFAZOLIN SODIUM 1 G/3ML
INJECTION, POWDER, FOR SOLUTION INTRAMUSCULAR; INTRAVENOUS PRN
Status: DISCONTINUED | OUTPATIENT
Start: 2022-06-24 | End: 2022-06-24 | Stop reason: SDUPTHER

## 2022-06-24 RX ORDER — DEXAMETHASONE SODIUM PHOSPHATE 10 MG/ML
INJECTION, SOLUTION INTRAMUSCULAR; INTRAVENOUS PRN
Status: DISCONTINUED | OUTPATIENT
Start: 2022-06-24 | End: 2022-06-24 | Stop reason: SDUPTHER

## 2022-06-24 RX ORDER — MORPHINE SULFATE 2 MG/ML
2 INJECTION, SOLUTION INTRAMUSCULAR; INTRAVENOUS EVERY 4 HOURS PRN
Status: DISCONTINUED | OUTPATIENT
Start: 2022-06-24 | End: 2022-06-27 | Stop reason: HOSPADM

## 2022-06-24 RX ORDER — FENTANYL CITRATE 50 UG/ML
25 INJECTION, SOLUTION INTRAMUSCULAR; INTRAVENOUS EVERY 5 MIN PRN
Status: DISCONTINUED | OUTPATIENT
Start: 2022-06-24 | End: 2022-06-24 | Stop reason: HOSPADM

## 2022-06-24 RX ORDER — KETAMINE HCL IN NACL, ISO-OSM 100MG/10ML
SYRINGE (ML) INJECTION PRN
Status: DISCONTINUED | OUTPATIENT
Start: 2022-06-24 | End: 2022-06-24 | Stop reason: SDUPTHER

## 2022-06-24 RX ORDER — HYDROCODONE BITARTRATE AND ACETAMINOPHEN 5; 325 MG/1; MG/1
2 TABLET ORAL EVERY 6 HOURS PRN
Status: DISCONTINUED | OUTPATIENT
Start: 2022-06-24 | End: 2022-06-25

## 2022-06-24 RX ORDER — HYDROCODONE BITARTRATE AND ACETAMINOPHEN 5; 325 MG/1; MG/1
1 TABLET ORAL EVERY 6 HOURS PRN
Qty: 20 TABLET | Refills: 0 | Status: SHIPPED | OUTPATIENT
Start: 2022-06-24 | End: 2022-07-01

## 2022-06-24 RX ORDER — METOPROLOL TARTRATE 5 MG/5ML
INJECTION INTRAVENOUS PRN
Status: DISCONTINUED | OUTPATIENT
Start: 2022-06-24 | End: 2022-06-24 | Stop reason: SDUPTHER

## 2022-06-24 RX ADMIN — MORPHINE SULFATE 2 MG: 2 INJECTION, SOLUTION INTRAMUSCULAR; INTRAVENOUS at 08:41

## 2022-06-24 RX ADMIN — SODIUM CHLORIDE: 9 INJECTION, SOLUTION INTRAVENOUS at 12:50

## 2022-06-24 RX ADMIN — HYDROCODONE BITARTRATE AND ACETAMINOPHEN 2 TABLET: 5; 325 TABLET ORAL at 19:56

## 2022-06-24 RX ADMIN — MORPHINE SULFATE 4 MG: 4 INJECTION, SOLUTION INTRAMUSCULAR; INTRAVENOUS at 02:09

## 2022-06-24 RX ADMIN — ROCURONIUM BROMIDE 40 MG: 10 INJECTION, SOLUTION INTRAVENOUS at 12:23

## 2022-06-24 RX ADMIN — Medication 25 MCG: at 12:23

## 2022-06-24 RX ADMIN — Medication 10 MG: at 12:43

## 2022-06-24 RX ADMIN — PROPOFOL 60 MG: 10 INJECTION, EMULSION INTRAVENOUS at 12:23

## 2022-06-24 RX ADMIN — Medication 25 MCG: at 13:00

## 2022-06-24 RX ADMIN — SODIUM CHLORIDE: 9 INJECTION, SOLUTION INTRAVENOUS at 18:50

## 2022-06-24 RX ADMIN — LIDOCAINE HYDROCHLORIDE 100 MG: 20 INJECTION, SOLUTION EPIDURAL; INFILTRATION; INTRACAUDAL; PERINEURAL at 12:23

## 2022-06-24 RX ADMIN — DEXAMETHASONE SODIUM PHOSPHATE 10 MG: 10 INJECTION INTRAMUSCULAR; INTRAVENOUS at 12:54

## 2022-06-24 RX ADMIN — Medication 25 MCG: at 13:14

## 2022-06-24 RX ADMIN — DOCUSATE SODIUM 100 MG: 100 CAPSULE, LIQUID FILLED ORAL at 19:57

## 2022-06-24 RX ADMIN — CEFAZOLIN 2000 MG: 10 INJECTION, POWDER, FOR SOLUTION INTRAVENOUS at 19:59

## 2022-06-24 RX ADMIN — HYDROCODONE BITARTRATE AND ACETAMINOPHEN 2 TABLET: 5; 325 TABLET ORAL at 16:31

## 2022-06-24 RX ADMIN — CEFAZOLIN 2000 MG: 1 INJECTION, POWDER, FOR SOLUTION INTRAMUSCULAR; INTRAVENOUS at 12:33

## 2022-06-24 RX ADMIN — Medication 100 MCG: at 13:40

## 2022-06-24 RX ADMIN — Medication 25 MCG: at 13:45

## 2022-06-24 RX ADMIN — SODIUM CHLORIDE: 9 INJECTION, SOLUTION INTRAVENOUS at 02:08

## 2022-06-24 RX ADMIN — Medication 15 MG: at 13:02

## 2022-06-24 RX ADMIN — SODIUM CHLORIDE, POTASSIUM CHLORIDE, SODIUM LACTATE AND CALCIUM CHLORIDE: 600; 310; 30; 20 INJECTION, SOLUTION INTRAVENOUS at 12:55

## 2022-06-24 RX ADMIN — SUGAMMADEX 200 MG: 100 INJECTION, SOLUTION INTRAVENOUS at 13:55

## 2022-06-24 RX ADMIN — PROPOFOL 40 MG: 10 INJECTION, EMULSION INTRAVENOUS at 13:34

## 2022-06-24 RX ADMIN — ONDANSETRON 4 MG: 2 INJECTION, SOLUTION INTRAMUSCULAR; INTRAVENOUS at 13:23

## 2022-06-24 RX ADMIN — METOPROLOL TARTRATE 2 MG: 5 INJECTION INTRAVENOUS at 14:00

## 2022-06-24 ASSESSMENT — PAIN DESCRIPTION - ORIENTATION
ORIENTATION: LEFT
ORIENTATION: LEFT;LOWER
ORIENTATION: LEFT

## 2022-06-24 ASSESSMENT — PAIN DESCRIPTION - DESCRIPTORS
DESCRIPTORS: ACHING;DISCOMFORT
DESCRIPTORS: SORE
DESCRIPTORS: ACHING;DISCOMFORT
DESCRIPTORS: ACHING

## 2022-06-24 ASSESSMENT — PAIN DESCRIPTION - LOCATION
LOCATION: HIP

## 2022-06-24 ASSESSMENT — PAIN DESCRIPTION - PAIN TYPE
TYPE: ACUTE PAIN
TYPE: SURGICAL PAIN
TYPE: ACUTE PAIN
TYPE: ACUTE PAIN

## 2022-06-24 ASSESSMENT — PAIN SCALES - GENERAL
PAINLEVEL_OUTOF10: 7
PAINLEVEL_OUTOF10: 5
PAINLEVEL_OUTOF10: 6
PAINLEVEL_OUTOF10: 6
PAINLEVEL_OUTOF10: 7
PAINLEVEL_OUTOF10: 5
PAINLEVEL_OUTOF10: 5

## 2022-06-24 ASSESSMENT — PAIN DESCRIPTION - FREQUENCY
FREQUENCY: CONTINUOUS
FREQUENCY: INTERMITTENT
FREQUENCY: CONTINUOUS

## 2022-06-24 ASSESSMENT — PAIN DESCRIPTION - ONSET
ONSET: ON-GOING

## 2022-06-24 ASSESSMENT — PAIN - FUNCTIONAL ASSESSMENT
PAIN_FUNCTIONAL_ASSESSMENT: INTOLERABLE, UNABLE TO DO ANY ACTIVE OR PASSIVE ACTIVITIES
PAIN_FUNCTIONAL_ASSESSMENT: PREVENTS OR INTERFERES WITH ALL ACTIVE AND SOME PASSIVE ACTIVITIES
PAIN_FUNCTIONAL_ASSESSMENT: PREVENTS OR INTERFERES WITH ALL ACTIVE AND SOME PASSIVE ACTIVITIES

## 2022-06-24 ASSESSMENT — LIFESTYLE VARIABLES: SMOKING_STATUS: 0

## 2022-06-24 NOTE — PLAN OF CARE
Problem: Discharge Planning  Goal: Discharge to home or other facility with appropriate resources  6/24/2022 0430 by Carol Petersen RN  Outcome: Progressing  Flowsheets (Taken 6/23/2022 2000)  Discharge to home or other facility with appropriate resources: Identify barriers to discharge with patient and caregiver     Problem: Pain  Goal: Verbalizes/displays adequate comfort level or baseline comfort level  6/24/2022 0430 by Carol Petersen RN  Outcome: Progressing     Problem: Skin/Tissue Integrity  Goal: Absence of new skin breakdown  Description: 1. Monitor for areas of redness and/or skin breakdown  2. Assess vascular access sites hourly  3. Every 4-6 hours minimum:  Change oxygen saturation probe site  4. Every 4-6 hours:  If on nasal continuous positive airway pressure, respiratory therapy assess nares and determine need for appliance change or resting period.   6/24/2022 0430 by Carol Petersen RN  Outcome: Progressing     Problem: Safety - Adult  Goal: Free from fall injury  6/24/2022 0430 by Carol Petersen RN  Outcome: Progressing  Flowsheets (Taken 6/23/2022 2220)  Free From Fall Injury:   Marisa Schumacher family/caregiver on patient safety   Based on caregiver fall risk screen, instruct family/caregiver to ask for assistance with transferring infant if caregiver noted to have fall risk factors     Problem: ABCDS Injury Assessment  Goal: Absence of physical injury  6/24/2022 0430 by Carol Petersen RN  Outcome: Progressing  Flowsheets (Taken 6/23/2022 2220)  Absence of Physical Injury: Implement safety measures based on patient assessment

## 2022-06-24 NOTE — CONSULTS
[facility] MED SURG  [address]  [address] [facility] [address] [address]  Dept: [phone]  Loc: [phone]    Inpatient Orthopedic Consult      CHIEF COMPLAINT:    left hip pain    HISTORY OF PRESENT ILLNESS:      The patient is a 76 y.o. female who is being seen as an inpatient for consultation and evaluation of the above complaint which occurred secondary to a fall from standing height. The patient experienced immediate pain at the hip and difficulty with ambulation/weight bearing. The patient was brought to the emergency department where imaging revealed a hip fracture, and Orthopaedics was consulted for evaluation and definitive treatment. The patient localizes the pain to the above hip. Prior to this, the patient used a walker for ambulation, due to multiple left lower extremity fractures in the past as well as a limb length discrepancy. History is obtained today from:   [x]  the patient     [x]  EMR     []  one family member/friend    []  multiple family members/friends    []  other:           REVIEW OF SYSTEMS:  Constitutional: Negative for fever. HENT: Negative for tinnitus. Eyes: Negative for pain. Respiratory: Negative for shortness of breath. Cardiovascular: Negative for chest pain. Gastrointestinal: Negative for abdominal pain. Genitourinary: Negative for dysuria. Skin: Negative for rash. Neurological: Negative for headaches. Hematological: Does not bruise/bleed easily.    Musculoskeletal: See HPI for pertinent positives     Past Medical History:    Past Medical History:   Diagnosis Date    Atrial fibrillation ([address]     HTN (hypertension)        Past Surgical History:    Past Surgical History:   Procedure Laterality Date    APPENDECTOMY         Current Medications:   Current Facility-Administered Medications   Medication Dose Route Frequency Provider Last Rate Last Admin    morphine (PF) injection 2 mg  2 mg IntraVENous Q2H PRN WISAM Hector - CNP   2 mg at 06/24/22 0841    Or    morphine sulfate (PF) injection 4 mg  4 mg IntraVENous Q2H PRN Deepak Quant, APRN - CNP   4 mg at 06/24/22 0209    potassium chloride (KLOR-CON M) extended release tablet 40 mEq  40 mEq Oral PRN Deepak Quant, APRN - CNP        Or    potassium bicarb-citric acid (EFFER-K) effervescent tablet 40 mEq  40 mEq Oral PRN Deepak Quant, APRN - CNP        Or    potassium chloride 10 mEq/100 mL IVPB (Peripheral Line)  10 mEq IntraVENous PRN Deepak Quant, APRN - CNP        magnesium sulfate 1000 mg in dextrose 5% 100 mL IVPB  1,000 mg IntraVENous PRN Deepak Quant, APRN - CNP        ondansetron (ZOFRAN-ODT) disintegrating tablet 4 mg  4 mg Oral Q8H PRN Deepak Quant, APRN - CNP        Or    ondansetron [facility]) injection 4 mg  4 mg IntraVENous Q6H PRN Deepak Quant, APRN - CNP        acetaminophen (TYLENOL) tablet 650 mg  650 mg Oral Q6H PRN Deepak Quant, APRN - CNP        Or    acetaminophen (TYLENOL) suppository 650 mg  650 mg Rectal Q6H PRN Deepak Quant, APRN - CNP        0.9 % sodium chloride infusion   IntraVENous Continuous Deepak Quant, APRN - CNP 75 mL/hr at 06/24/22 0534 Rate Verify at 06/24/22 0534    HYDROcodone-acetaminophen (NORCO) 5-325 MG per tablet 1 tablet  1 tablet Oral Q4H PRN Pushpa Rock APRN - NP   1 tablet at 06/23/22 2310    polyethylene glycol (GLYCOLAX) packet 17 g  17 g Oral Daily Pushpa Rock APRN - NP        docusate sodium (COLACE) capsule 100 mg  100 mg Oral BID Pushpa Rock APRN - NP   100 mg at 06/23/22 2007    sodium chloride flush 0.9 % injection 5-40 mL  5-40 mL IntraVENous 2 times per day Pushpa Rock APRN - NP        sodium chloride flush 0.9 % injection 5-40 mL  5-40 mL IntraVENous PRN Pushpa Rock APRN - NP        0.9 % sodium chloride infusion   IntraVENous PRN Pushpa Rock APRN - NP        thiamine mononitrate tablet 100 mg 100 mg Oral Daily WISAM Bolaños NP        folic acid (FOLVITE) tablet 1 mg  1 mg Oral Daily WISAM Bolaños NP        morphine (PF) injection 2 mg  2 mg IntraVENous Once Mortensen DO C[place]           Allergies:    Sulfamethoxazole-trimethoprim    Family History:  Family History   Problem Relation Age of Onset    Heart Disease Mother     Cancer Father     Hypertension Sister     Stroke Sister        Social History:   Social History     Socioeconomic History    Marital status: Single     Spouse name: Not on file    Number of children: Not on file    Years of education: Not on file    Highest education level: Not on file   Occupational History    Not on file   Tobacco Use    Smoking status: Former Smoker     Quit date:      Years since quittin.5    Smokeless tobacco: Never Used   Vaping Use    Vaping Use: Never used   Substance and Sexual Activity    Alcohol use: Yes     Comment: endorses a bottle of wine every other day    Drug use: Never    Sexual activity: Not on file   Other Topics Concern    Not on file   Social History Narrative    Not on file     Social Determinants of Health     Financial Resource Strain:     Difficulty of Paying Living Expenses: Not on file   Food Insecurity:     Worried About [email or web address] in the Last Year: Not on file    [address] in the Last Year: Not on file   Transportation Needs:     Lack of Transportation (Medical): Not on file    Lack of Transportation (Non-Medical):  Not on file   Physical Activity:     Days of Exercise per Week: Not on file    Minutes of Exercise per Session: Not on file   Stress:     Feeling of Stress : Not on file   Social Connections:     Frequency of Communication with Friends and Family: Not on file    Frequency of Social Gatherings with Friends and Family: Not on file    Attends Rastafarian Services: Not on file    Active Member of Clubs or Organizations: Not on file    Attends Club or Organization Meetings: Not on file    Marital Status: Not on file   Intimate Partner Violence:     Fear of Current or Ex-Partner: Not on file    Emotionally Abused: Not on file    Physically Abused: Not on file    Sexually Abused: Not on file   Housing Stability:     Unable to Pay for Housing in the Last Year: Not on file    Number of [place] in the Last Year: Not on file    Unstable Housing in the Last Year: Not on file          OBJECTIVE:  BP (!) 151/47   Pulse 87   Temp 97.8 °F (36.6 °C) (Oral)   Resp 18   Ht 5' 6\" (1.676 m)   Wt 161 lb (73 kg)   SpO2 93%   BMI 25.99 kg/m²    Psych: awake, alert, no acute distress and alert and oriented to person, time, and place. Cardio:  well perfused extremities, no cyanosis  Resp:  normal respiratory effort, symmetric chest expansion  Eyes:  PERRLA, anicteric sclera  ENT:  moist/intact mucosa, patent nares  Neck:  supple, trachea midline  GI:  soft, nontender    Affected left lower extremity:    Vascular: Limb well perfused, compartments soft/compressible. Skin: No erythema/ulcers. Intact over the lateral aspect of the hip. Neurovascular Status:  Grossly neurovascularly intact distally  Motion:  Grossly able to fire major muscle groups with appropriate/expected AROM  Tenderness to Palpation: Hip diffusely  -Limb shortened and rotated      Secondary survey exam:  No gross deformity, erythema, or significant tenderness of other bony prominences/joints   No tenderness to palpation over other bony prominences/joints of the bilateral upper and lower extremities   Log roll test negative on contralateral hip  Grossly neurovascularly intact bilateral upper extremity without focal deficit      RADIOLOGY:   Radiographic images and reports reviewed. Intertrochanteric fracture of the left hip. XR FEMUR LEFT (MIN 2 VIEWS)    Result Date: 6/23/2022  EXAMINATION: 2 XRAY VIEWS OF THE LEFT FEMUR 6/22/2022 11:29 pm COMPARISON: Pelvis and left hip series today.  HISTORY: ORDERING SYSTEM PROVIDED HISTORY: Pain TECHNOLOGIST PROVIDED HISTORY: Pain FINDINGS: The impacted, angulated intertrochanteric femur fracture is again demonstrated. Deformity in the distal femoral metaphysis is noted with findings of chronicity. No joint effusion. Fixation hardware in the proximal tibia is noted. Calcified atheromatous plaque. 1.  Redemonstration of impacted, angulated intertrochanteric femur fracture. 2.  Findings compatible with old trauma in the distal femoral metaphysis and prior surgery in the proximal tibia. XR CHEST 1 VIEW    Result Date: 6/23/2022  EXAMINATION: ONE XRAY VIEW OF THE CHEST 6/22/2022 11:29 pm COMPARISON: None. HISTORY: ORDERING SYSTEM PROVIDED HISTORY: pre-op TECHNOLOGIST PROVIDED HISTORY: pre-op FINDINGS: The cardiomediastinal silhouette appears mildly enlarged. The lungs are well expanded. There is no consolidation, pneumothorax, or evidence of edema. No effusion is appreciated. Old healed bilateral rib fractures. Chronic findings in the chest without acute airspace disease identified. XR HIP 2-3 VW W PELVIS LEFT    Result Date: 6/22/2022  EXAMINATION: ONE XRAY VIEW OF THE PELVIS AND TWO XRAY VIEWS LEFT HIP 6/22/2022 10:53 pm COMPARISON: None. HISTORY: ORDERING SYSTEM PROVIDED HISTORY: pain, concern for fx TECHNOLOGIST PROVIDED HISTORY: pain, concern for fx Reason for Exam: Lt hip pain, concern for fx; fell FINDINGS: Impacted, angulated intertrochanteric left femur fracture. Moderate joint space narrowing in both hips. Pelvic alignment is maintained. Impacted and angulated intertrochanteric left femur fracture.        LABS:   Lab Results   Component Value Date    WBC 5.4 06/24/2022    HGB 9.3 (L) 06/24/2022    HCT 28.6 (L) 06/24/2022    MCV 96.3 06/24/2022     06/24/2022     Lab Results   Component Value Date     06/24/2022    K 4.4 06/24/2022     06/24/2022    CO2 21 06/24/2022    BUN 11 06/24/2022    CREATININE 0.74 06/24/2022 GLUCOSE 115 06/24/2022    CALCIUM 8.8 06/24/2022          ASSESSMENT AND PLAN:  Body mass index is 25.99 kg/m². She has a left hip intertrochanteric fracture, sustained on 6/22/2022. Notably, she has a complex past medical history. She has a history of COPD, cerebrovascular disease, history of AV block and atrial fibrillation, history of intra-abdominal abscess, history of malnutrition, history of alcoholism. We had a discussion today about the likely diagnosis and its natural history, physical exam and imaging findings, as well as various treatment options in detail. Surgically, we discussed hardware fixation with a cephalomedullary nail. We discussed the risks of failure of the hardware/fixation, possible future surgery, wound healing complication/infection, delayed bone healing/nonunion and/or malunion, as well as the risks below. Her medical clearance was reviewed and discussed, that she was deemed moderate risk for surgery. We discussed that the risks of nonoperative treatment include pneumonia, urinary tract infection, skin breakdown/ulcer, and DVT/PE, and I did recommend surgical intervention in order to increase the patient's function, allow early mobilization, and decrease but not prevent these risks, as soon as it is safe medically to proceed. We discussed the expected postoperative course, including the relevant restrictions. As a result of our discussion including risks/benefits/alternatives, they are able to make an informed decision, and have elected to proceed with surgical management. We spoke about the risks/benefits/alternatives to a surgical intervention.  They understand that the risks of surgery may include but are not limited to pain, infection, bleeding, blood clot, damage to soft tissue/vessel/nerve, future surgery, scarring/stiffness, decreased strength/weakness, cosmetic deformity, neuroma/neuritis/phantom pains, delayed soft tissue/bone healing, nonunion, malunion, failure of hardware/fixation/surgery, iatrogenic fracture/dislocation, damage to bone/joint(s), worsening of condition, recurrence, limb length discrepancy, avascular necrosis of bone, recurrent dislocation, neurovascular compromise/compartment syndrome, tourniquet complications, failure of surgery, dissatisfaction with outcome, loss of limb, stroke, heart attack, pulmonary embolus, mental status change, anesthesia risks/reaction, and even death. They expressed verbal understanding of the risks and wish to proceed with surgical intervention. All questions were answered. No guarantees were made or implied. Informed consent was obtained. -DVT prophylaxis  -pain control  -Medical optimization   -NPO now  -The plan is to proceed with the following: Left hip cephalomedullary nail    All questions were answered and the patient agrees with the above plan. I will continue to follow the patient while she is in the hospital.       Candice Chanel MD  Orthopedic Surgery        Please excuse any typos/errors, as this note was created with the assistance of voice recognition software. While intending to generate a document that actually reflects the content of the visit, the document can still have some errors including those of syntax and sound-a-like substitutions which may escape proof reading. In such instances, actual meaning can be extrapolated by context.

## 2022-06-24 NOTE — ANESTHESIA PRE PROCEDURE
Department of Anesthesiology  Preprocedure Note       Name:  Macario Lugo   Age:  76 y.o.  :  1945                                          MRN:  [id number]         Date:  2022      Surgeon: Leo Abraham):  Heraclio Hollis MD    Procedure: Procedure(s):  LEFT HIP  TROCH NAIL  - SYNTHES    Medications prior to admission:   Prior to Admission medications    Medication Sig Start Date End Date Taking?  Authorizing Provider   cyanocobalamin 1000 MCG/ML injection Inject 1,000 mcg into the muscle every 30 days Indications: on the  of the month  3/25/22  Yes Historical Provider, MD   fenofibrate (TRICOR) 145 MG tablet Take 145 mg by mouth daily 22  Yes Historical Provider, MD   omeprazole (PRILOSEC) 20 MG delayed release capsule Take 20 mg by mouth daily   Yes Historical Provider, MD   metoprolol succinate (TOPROL XL) 50 MG extended release tablet Take 50 mg by mouth daily   Yes Historical Provider, MD   albuterol sulfate HFA (VENTOLIN HFA) 108 (90 Base) MCG/ACT inhaler Inhale 2 puffs into the lungs every 6 hours as needed for Wheezing or Shortness of Breath   Yes Historical Provider, MD   acetaminophen (TYLENOL) 500 MG tablet Take 500-1,000 mg by mouth every 6 hours as needed for Pain   Yes Historical Provider, MD   magnesium oxide (MAG-OX) 400 MG tablet Take 400 mg by mouth daily   Yes Historical Provider, MD   ELIQUIS 5 MG TABS tablet Take 5 mg by mouth 2 times daily  22   Historical Provider, MD   aspirin 81 MG EC tablet Take 81 mg by mouth daily    Historical Provider, MD   citalopram (CELEXA) 20 MG tablet Take 20 mg by mouth nightly  22   Historical Provider, MD   docusate (COLACE, DULCOLAX) 100 MG CAPS Take 100 mg by mouth daily as needed (constipation)     Historical Provider, MD   lovastatin (MEVACOR) 40 MG tablet Take 40 mg by mouth nightly  22   Historical Provider, MD       Current medications:    Current Facility-Administered Medications   Medication Dose Route Frequency Provider Last Rate Last Admin    morphine (PF) injection 2 mg  2 mg IntraVENous Q2H PRN Christinemagan Gray, APRN - CNP   2 mg at 06/24/22 0841    Or    morphine sulfate (PF) injection 4 mg  4 mg IntraVENous Q2H PRN Christinemagan Gray, APRN - CNP   4 mg at 06/24/22 0209    potassium chloride (KLOR-CON M) extended release tablet 40 mEq  40 mEq Oral PRN Christine JIM GrayN - CNP        Or    potassium bicarb-citric acid (EFFER-K) effervescent tablet 40 mEq  40 mEq Oral PRN Christine Isaac, APRN - CNP        Or    potassium chloride 10 mEq/100 mL IVPB (Peripheral Line)  10 mEq IntraVENous PRN ChristineWISAM Hale - CNP        magnesium sulfate 1000 mg in dextrose 5% 100 mL IVPB  1,000 mg IntraVENous PRN Christinemagan Gray, WISAM - ARUNA        ondansetron (ZOFRAN-ODT) disintegrating tablet 4 mg  4 mg Oral Q8H PRN WISAM Aly - CNP        Or    [facility]ron [facility] injection 4 mg  4 mg IntraVENous Q6H PRN WISAM Ayl - CNP        acetaminophen (TYLENOL) tablet 650 mg  650 mg Oral Q6H PRN WISAM Aly - CNP        Or    acetaminophen (TYLENOL) suppository 650 mg  650 mg Rectal Q6H PRN WISAM Aly - CNP        0.9 % sodium chloride infusion   IntraVENous Continuous ChristineWISAM Hale - CNP 75 mL/hr at 06/24/22 0534 Rate Verify at 06/24/22 0534    HYDROcodone-acetaminophen (NORCO) 5-325 MG per tablet 1 tablet  1 tablet Oral Q4H PRN Savage Urban APRN - NP   1 tablet at 06/23/22 2310    polyethylene glycol (GLYCOLAX) packet 17 g  17 g Oral Daily Savage Urban APRN - NP        docusate sodium (COLACE) capsule 100 mg  100 mg Oral BID Savage Urban APRN - NP   100 mg at 06/23/22 2007    sodium chloride flush 0.9 % injection 5-40 mL  5-40 mL IntraVENous 2 times per day WISAM Siddiqi - NP        sodium chloride flush 0.9 % injection 5-40 mL  5-40 mL IntraVENous PRN Savage Urban, APRN - NP       Mackenzie Sesay 0.9 % sodium chloride infusion   IntraVENous PRN Albesa New, APRN - NP        thiamine mononitrate tablet 100 mg  100 mg Oral Daily Albesa [place], APRN - NP        folic acid (FOLVITE) tablet 1 mg  1 mg Oral Daily Albesa New, APRN - NP        morphine (PF) injection 2 mg  2 mg IntraVENous Once Lisa Mu, DO           Allergies: Allergies   Allergen Reactions    Sulfamethoxazole-Trimethoprim      Other reaction(s): GI Disturbance  This agent causes hyperkalemia and acute kidney injury in this patient       Problem List:    Patient Active Problem List   Diagnosis Code    Closed left hip fracture, initial encounter ([facility] S72.002A    Alcoholism ([facility] F10.20    Persistent atrial fibrillation ([facility] I48.19    B12 deficiency anemia D51.9    Cerebrovascular disease I67.9    Closed fracture of transverse process of lumbar vertebra ([facility] S32.009A    COPD without exacerbation ([facility] J44.9    Depression F32. A    Diverticulosis of large intestine K57.30    Essential hypertension I10    Femur fracture, left (HCC) S72. 92XA    Mixed hyperlipidemia E78.2    Hyponatremia E87.1    Intra-abdominal abscess (HCC) K65.1    Iron deficiency anemia secondary to inadequate dietary iron intake D50.8    Malnutrition (HCC) E46    Mild intermittent asthma without complication J32.86    Nausea after anesthesia T88.59XA, R11.0    Sensory hearing loss, bilateral H90.3    Vision impairment H54.7    First degree AV block I44.0    Chronic anticoagulation Z79.01    Pre-op evaluation Z01.818       Past Medical History:        Diagnosis Date    Atrial fibrillation (HCC)     HTN (hypertension)        Past Surgical History:        Procedure Laterality Date    APPENDECTOMY         Social History:    Social History     Tobacco Use    Smoking status: Former Smoker     Quit date:      Years since quittin.5    Smokeless tobacco: Never Used   Substance Use Topics    Alcohol use: Yes     Comment: endorses a bottle of wine every other day                                Counseling given: No      Vital Signs (Current):   Vitals:    06/24/22 0105 06/24/22 0338 06/24/22 0629 06/24/22 0841   BP: (!) 167/55 (!) 159/57 (!) 151/47    Pulse: 79 83 87    Resp:  18 17 18   Temp:  97.5 °F (36.4 °C) 97.8 °F (36.6 °C)    TempSrc:  Oral Oral    SpO2:  94% 93%    Weight:  161 lb (73 kg)     Height:                                                  BP Readings from Last 3 Encounters:   06/24/22 (!) 151/47       NPO Status:                                                   Date of last liquid consumption: 06/23/22                        Date of last solid food consumption: 06/23/22    BMI:   Wt Readings from Last 3 Encounters:   06/24/22 161 lb (73 kg)     Body mass index is 25.99 kg/m². CBC:   Lab Results   Component Value Date    WBC 5.4 06/24/2022    RBC 2.97 06/24/2022    HGB 9.3 06/24/2022    HCT 28.6 06/24/2022    MCV 96.3 06/24/2022    RDW 13.2 06/24/2022     06/24/2022       CMP:   Lab Results   Component Value Date     06/24/2022    K 4.4 06/24/2022     06/24/2022    CO2 21 06/24/2022    BUN 11 06/24/2022    CREATININE 0.74 06/24/2022    GFRAA >60 06/24/2022    LABGLOM >60 06/24/2022    GLUCOSE 115 06/24/2022    PROT 6.7 06/22/2022    CALCIUM 8.8 06/24/2022    BILITOT 0.22 06/22/2022    ALKPHOS 32 06/22/2022    AST 16 06/22/2022    ALT 8 06/22/2022       POC Tests: No results for input(s): POCGLU, POCNA, POCK, POCCL, POCBUN, POCHEMO, POCHCT in the last 72 hours.     Coags:   Lab Results   Component Value Date    PROTIME 14.5 06/23/2022    INR 1.1 06/23/2022    APTT 30.7 06/22/2022       HCG (If Applicable): No results found for: PREGTESTUR, PREGSERUM, HCG, HCGQUANT     ABGs: No results found for: PHART, PO2ART, ZNY0ODA, WFS0CZK, BEART, Z8SEODOU     Type & Screen (If Applicable):  No results found for: LABABO, LABRH    Drug/Infectious Status (If Applicable):  No results found for: HIV, HEPCAB    COVID-19 Screening (If Applicable): No results found for: COVID19        Anesthesia Evaluation     history of anesthetic complications: PONV. Airway: Mallampati: II  TM distance: >3 FB   Neck ROM: full  Mouth opening: > = 3 FB   Dental:          Pulmonary:normal exam    (+) COPD:  asthma:     (-) not a current smoker                           Cardiovascular:  Exercise tolerance: poor (<4 METS),   (+) hypertension:, dysrhythmias: atrial fibrillation, hyperlipidemia    (-)  angina    ECG reviewed  Rhythm: irregular  Rate: normal                    Neuro/Psych:   (+) psychiatric history (etohism):            GI/Hepatic/Renal:   (+) GERD: well controlled,           Endo/Other:    (+) blood dyscrasia: anemia:., .                 Abdominal:             Vascular: Other Findings:           Anesthesia Plan      general     ASA 4       Induction: intravenous. MIPS: Postoperative opioids intended and Prophylactic antiemetics administered. Anesthetic plan and risks discussed with patient. Use of blood products discussed with patient whom. Plan discussed with CRNA and attending. Attending anesthesiologist reviewed and agrees with Preprocedure content            From h and p     Admit inpatient  Orthopedic consultation  EKG reviewed, sinus rhythm with first-degree AV block. Most recent echocardiogram reviewed, preserved ejection fraction 65 to 70%.   Patient is cleared as a moderate risk for surgical repair of her hip  Hold Eliquis, resume postop  Monitor and control blood pressure  Aerosol protocol  Incentive spirometry  Stop lorazepam, monitor for signs of withdrawal  See orders for details      Marlee Cortez MD   6/24/2022

## 2022-06-24 NOTE — PLAN OF CARE
Problem: Discharge Planning  Goal: Discharge to home or other facility with appropriate resources  6/24/2022 0835 by Josué Leiva RN  Outcome: Not Progressing  6/24/2022 0430 by Dilan Ness RN  Outcome: Progressing  Flowsheets (Taken 6/23/2022 2000)  Discharge to home or other facility with appropriate resources: Identify barriers to discharge with patient and caregiver     Problem: Pain  Goal: Verbalizes/displays adequate comfort level or baseline comfort level  6/24/2022 0835 by Josué Leiva RN  Outcome: Not Progressing  6/24/2022 0430 by Dilan Ness RN  Outcome: Progressing     Problem: Skin/Tissue Integrity  Goal: Absence of new skin breakdown  Description: 1. Monitor for areas of redness and/or skin breakdown  2. Assess vascular access sites hourly  3. Every 4-6 hours minimum:  Change oxygen saturation probe site  4. Every 4-6 hours:  If on nasal continuous positive airway pressure, respiratory therapy assess nares and determine need for appliance change or resting period.   6/24/2022 0835 by Josué Leiva RN  Outcome: Not Progressing  6/24/2022 0430 by Dilan Ness RN  Outcome: Progressing     Problem: Safety - Adult  Goal: Free from fall injury  6/24/2022 0835 by Josué Leiva RN  Outcome: Not Progressing  6/24/2022 0430 by Dilan Ness RN  Outcome: Progressing  Flowsheets (Taken 6/23/2022 2220)  Free From Fall Injury:   Instruct family/caregiver on patient safety   Based on caregiver fall risk screen, instruct family/caregiver to ask for assistance with transferring infant if caregiver noted to have fall risk factors     Problem: ABCDS Injury Assessment  Goal: Absence of physical injury  6/24/2022 0835 by Josué Leiva RN  Outcome: Not Progressing  6/24/2022 0430 by Dilan Ness RN  Outcome: Progressing  Flowsheets (Taken 6/23/2022 2220)  Absence of Physical Injury: Implement safety measures based on patient assessment

## 2022-06-24 NOTE — OP NOTE
[address]  [facility] [place] [address]  Dept: [phone]: [phone]      Orthopedic Surgery Operative Report      Patient: Nathanael Muñiz      MRN#: [id number]     YOB: 1945      Date of Admission: 6/22/2022     Attending Surgeon: Angel Mehta M.D.   PCP: No primary care provider on file. Preoperative Diagnosis:   1. Left closed intertrochanteric hip fracture  2. History of alcoholism  3. Atrial fibrillation  4. COPD  5. Cerebrovascular disease  6. History of malnutrition  7. Body mass index is 25.99 kg/m². Postoperative Diagnosis:   1. same    Procedures Performed:  (6/24/2022)  1. Open treatment of Left hip intertrochanteric femur fracture with cephalomedullary nail       Implants:    Synthes short TFN (125 degree 10 mm x 170 mm)  Implant Name Type Inv. Item Serial No.  Lot No. LRB No. Used Action   NAIL IM L170MM [id number] NK 125DEG SHT PROX FEM GRN TI-15MO - [id number]  NAIL IM L170MM [id number] NK 125DEG SHT PROX FEM GRN TI-15MO  DEPUY [employer] [place]- [id number] Left 1 Implanted   SCREW BNE L85MM DIA10.35MM G TI PURA PERF FOR PROX FEM - [id number]  SCREW BNE L85MM DIA10.35MM G TI PURA PERF FOR PROX FEM  DEPU[employer] [place]- [id number] Left 1 Implanted   SCREW BNE L34MM DIA5MM TIB LT GRN TI ST PURA EMMA FULL THRD - [id number]  SCREW BNE L34MM DIA5MM TIB LT GRN TI ST PURA EMMA FULL THRD  DEPUY [employer]NTHES [place]-WD [id number] Left 1 Implanted         Attending Surgeon:     Rianna Raya MD      Assistant Surgeon:    Resident: Elvia Stoll DO      Anesthesia:    General Endotracheal      Staff:  Surgeon(s):  Galindo Bartlett MD  Scrub Person First: Shakira Ordaz  Anesthesia: Lara Napier MD      Estimated Blood Loss:    Minimal      Complications:    None      Specimen:    None      History:    Ms. Nathanael Muñiz is a 76 y.o. female who was seen recently for the above problem.  After a history and physical examination as well as review of imaging studies, I confirmed that the patient had evidence of a Left peritrochanteric femur fracture. We discussed the significance of the injury. We reviewed treatment options. Surgical intervention was recommended. They understand that without operative intervention, risks of pulmonary embolism, ulcers, UTI, pneumonia, and death are unacceptably high. Operative intervention is usually needed for early mobilization and thus aids in the prevention, but not the elimination, of these complications. She has a history of a left hip intertrochanteric fracture, sustained on 2022.      Notably, she has a complex past medical history. She has a history of COPD, cerebrovascular disease, history of AV block and atrial fibrillation, history of intra-abdominal abscess, history of malnutrition, history of alcoholism. She is currently medically stable and appropriate for the planned procedure. We have spoken about the risks/benefits/alternatives to a surgical intervention, verbal understanding of these risks was expressed, and informed consent was obtained. All questions were answered. No guarantees were made or implied. I have answered all questions, and they wish to proceed with surgical intervention. Operative Note:    The patient was identified in the preoperative holding area by name, MRN, and . The surgical site was verified and marked according to AAOS guidelines. The patient was taken to the operating room on a stretcher. The patient underwent smooth induction of anesthesia. After achieving adequate sedation by the anesthesia team, the patient was then carefully transferred to the fracture table in a supine position and all bony prominences were then padded. A final timeout was held in which the patient's identity, surgical site, procedure, allergies, and antibiotics were verified, and all in the room were in agreement, and thus the procedure began.     The Left lower extremity was secured in a fracture boot and the contralateral lower extremity was secured in an extremity christina, and the hip was carefully adducted and extended. Closed reduction was then performed and visualized under fluoroscopy in the AP and lateral views. A combination of gentle longitudinal traction, slight adduction, and internal rotation were utilized to reduce the fracture. Once adequate reduction was obtained and confirmed using biplanar fluoroscopy, the hip and lower extremity were prepped and draped in the usual sterile fashion. The image intensifier was used to lon out the level of the greater trochanter on the coronal plane as well as the axis of the femoral shaft on the sagittal plane. A longitudinal incision was marked out proximal to the greater trochanter. The skin was sharply incised, and hemostasis was obtained. Dissection was carried out sharply through the subcutaneous layer down to the underlying muscle fascia. The fascia of the gluteus medius was split in line with the incision. The greater trochanter could then be easily palpated. Fracture reduction was again confirmed on the image intensifier using orthogonal views. Using the Synthes Advanced TFNA nail system, a partially threaded guide pin was placed on the tip of the greater trochanter and advanced distally in line with the femoral shaft. The advancement of the guide pin was confirmed using orthogonal images of the proximal femur with the image intensifier. An entry reamer was then utilized along with the tissue protector to the level of the lesser trochanter. The initial guide pin and reamer were then removed. A nail of 10 mm diameter by 170 mm length with a 125 degree neck angle was assembled with the insertion handle on the back table and carefully inserted into the intramedullary canal. An AP view of the hip was utilized to determine the appropriate depth of insertion, as judged by the intended pathway of the cephalomedullary fixation.   Attention was then turned to the lateral view to confirm that the intended pathway of a cephalo-medullary fixation to be in the center of the femoral head. Once the appropriate depth and anteversion were confirmed, the proximal jig and guide were used to drill a guidewire for the cephalomedullary fixation, which was measured and found to be 85 mm. The fixation was then carefully inserted. The appropriate position of the fixation was confirmed on orthogonal views of the femoral head. Compression was achieved through the nail, and confirmed using the image intensifier. A set screw was used proximally at the end of the nail, and then slightly backed off in order to allow for compression, which was then performed. The distal interlocking screw was then inserted using the targeting guide in a statically locked position. The proximal jig and targeting arm were then removed, and final fluoroscopic images including orthogonal views of the hip and proximal femur were obtained. Copious sterile irrigation was used to rinse the operative sites, and a layered closure was performed using 0 vicryl to close the muscle fascia, 2-0 vicryl and staples for the skin, providing appropriate tension to the skin. The skin was cleaned and gently dried. The incisions were then covered with sterile dressings. The patient was aroused from anesthesia without complication, and gently transferred to the bed and then transported to the postoperative area in a stable condition. The patient was noted to have tolerated the procedure well without complication.       Postoperative Plan:         Precautions: Weightbearing as tolerated on the Left lower extremity   -             []  Physical Therapy/Home exercises       Immobilization:      []  Splint/Cast  []  CAM boot  []  Comfortable shoe    []  Other:      DVT ppx:   [x]  Early mobilization       [x]  Medication as prescribed (resume her Eliquis for A. fib)      []  No chemical ppx needed; mechanical only   -    Pain control:  Medication as prescribed (dosing and quantity) indicated for acute postoperative pain control   -    Special concerns:       []          [x]  Avoid strenuous activity/pain provoking maneuvers and high-impact repetitive exercises    -    Disposition:   PACU      The patient has been instructed to follow up in the office. The particulars of surgery as well as the condition of the patient postoperatively were discussed with the patients family thereafter per the patient's wishes.            Tanmay Yoder MD  Orthopedic Surgery

## 2022-06-24 NOTE — PROGRESS NOTES
Dr Deepali Carranza to the patient room; surgical consent signed. Patient would like Dr Deepali Carranza to call her daughter Eloy Gomez after surgery today to update her.

## 2022-06-24 NOTE — ANESTHESIA POSTPROCEDURE EVALUATION
Department of Anesthesiology  Postprocedure Note    Patient: Lianet Golden  MRN: [id number]  YOB: 1945  Date of evaluation: 6/24/2022      Procedure Summary     Date: 06/24/22 Room / Location: [address] - INPATIENT    Anesthesia Start: 1218 Anesthesia Stop: 1241    Procedure: LEFT HIP  [address] (Left Hip) Diagnosis:       [facility] left [facility]      (DX LEFT HIP FRACTURE)    Surgeons: Sherry Nails MD Responsible Provider: Verito Phan MD    Anesthesia Type: general ASA Status: 4          Anesthesia Type: No value filed.     Mae Phase I: Mae Score: 10    Mae Phase II:        Anesthesia Post Evaluation    Patient location during evaluation: PACU  Patient participation: complete - patient participated  Level of consciousness: awake  Airway patency: patent  Nausea & Vomiting: no nausea  Complications: no  Cardiovascular status: blood pressure returned to baseline  Respiratory status: acceptable  Hydration status: euvolemic  Comments: Multimodal analgesia pain management as indicated by procedure  Multimodal analgesia pain management approach

## 2022-06-24 NOTE — PROGRESS NOTES
[facility]  Office: [address], DO, Yosef China, DO, Dionte Lyonivania, DO, Jose Eduardo Puente Blood, DO, Fei Levine MD, Carissa Colon MD, Veronica Singh MD, Jennifer Gonzalez MD, Bill Lopez MD, Peri Allen MD, Alejandra Pope MD, Gustavo Newberry, DO, Mario Hampton MD,  Annette Sapp, DO, Angela Contreras MD, Curt Vogel MD, Andra Guerrero DO, Tito Sheikh MD, Osvaldo Landis MD, Gregor Prasad, DO, Jenny Pace MD, Young Noriega MD, Sae Ny [facility], [facility], CNP, Deann Wellington, CNP, Jona Miranda CNP, Dank Ingram, CNP, Ifeoma Warren, CNP, Gayatri Raphael PA-C, Charmel Osgood, DNP, [place], ARUNA, Lizzy Sapp, CNP, Brian Plummer, CNP, Navya Medeiros, CNS, HILDA Warner, Corey Terry, CNP, Jah Gordon, [facility], Theron Palacios, [facility]    Progress Note    6/24/2022    8:49 AM    Name:   Anat Tejeda  MRN:     [id number]     Acct:      [de-identified]   Room:   210/2107-01   Day:  2  Admit Date:  6/22/2022 10:28 PM    PCP:   No primary care provider on file. Code Status:  Full Code    Subjective:     C/C:   Chief Complaint   Patient presents with    Fall    Hip Pain     Interval History Status: improved. Patient is resting comfortably, scheduled for surgery today at noon. Denies any chest pain, shortness of breath, nausea vomiting, fevers or chills. Brief History: This is a 75-year-old female that ambulates with a walker at home and had an accidental fall when she tripped over her walker. She had immediate onset of hip pain and was brought to the emergency room. She was found to have a closed left hip fracture and is admitted for orthopedic evaluation management.     Review of Systems:     Constitutional:  negative for chills, fevers, sweats  Respiratory:  negative for cough, dyspnea on exertion, shortness of breath, wheezing  Cardiovascular:  negative for chest pain, chest pressure/discomfort, lower extremity edema, palpitations  Gastrointestinal:  negative for abdominal pain, constipation, diarrhea, nausea, vomiting  Neurological:  negative for dizziness, headache    Medications: Allergies: Allergies   Allergen Reactions    Sulfamethoxazole-Trimethoprim      Other reaction(s): GI Disturbance  This agent causes hyperkalemia and acute kidney injury in this patient       Current Meds:   Scheduled Meds:    polyethylene glycol  17 g Oral Daily    docusate sodium  100 mg Oral BID    sodium chloride flush  5-40 mL IntraVENous 2 times per day    thiamine  100 mg Oral Daily    folic acid  1 mg Oral Daily    morphine  2 mg IntraVENous Once     Continuous Infusions:    sodium chloride 75 mL/hr at 22 0534    sodium chloride       PRN Meds: morphine **OR** morphine, potassium chloride **OR** potassium alternative oral replacement **OR** potassium chloride, magnesium sulfate, ondansetron **OR** ondansetron, acetaminophen **OR** acetaminophen, HYDROcodone 5 mg - acetaminophen, sodium chloride flush, sodium chloride    Data:     Past Medical History:   has a past medical history of Atrial fibrillation ([address] and HTN (hypertension). Social History:   reports that she quit smoking about 39 years ago. She has never used smokeless tobacco. She reports current alcohol use. She reports that she does not use drugs. Family History:   Family History   Problem Relation Age of Onset    Heart Disease Mother     Cancer Father     Hypertension Sister     Stroke Sister        Vitals:  BP (!) 151/47   Pulse 87   Temp 97.8 °F (36.6 °C) (Oral)   Resp 18   Ht 5' 6\" (1.676 m)   Wt 161 lb (73 kg)   SpO2 93%   BMI 25.99 kg/m²   Temp (24hrs), Av.1 °F (36.7 °C), Min:97.5 °F (36.4 °C), Max:98.9 °F (37.2 °C)    No results for input(s): POCGLU in the last 72 hours. I/O (24Hr):     Intake/Output Summary (Last 24 hours) at 2022 0801  Last data filed at 2022 0534  Gross per 24 hour   Intake 2114.45 ml   Output 1500 ml   Net 614.45 ml       Labs:  Hematology:  Recent Labs     06/22/22 2230 06/23/22  0714 06/24/22  0624   WBC 7.5  --  5.4   RBC 3.40*  --  2.97*   HGB 10.7*  --  9.3*   HCT 33.5*  --  28.6*   MCV 98.5  --  96.3   MCH 31.5  --  31.3   MCHC 31.9  --  32.5   RDW 13.3  --  13.2     --  188   MPV 8.9  --  8.2   INR 1.2 1.1  --      Chemistry:  Recent Labs     06/22/22 2230 06/23/22  0714 06/24/22  0624   * 131* 134*   K 3.7 4.3 4.4   CL 97* 99 104   CO2 19* 21 21   GLUCOSE 110* 113* 115*   BUN 23 16 11   CREATININE 0.97* 0.76 0.74   ANIONGAP 15 11 9   LABGLOM 55* >60 >60   GFRAA >60 >60 >60   CALCIUM 9.3 8.9 8.8     Recent Labs     06/22/22 2230   PROT 6.7   LABALBU 4.3   AST 16   ALT 8   ALKPHOS 32*   BILITOT 0.22*     ABG:No results found for: POCPH, PHART, PH, POCPCO2, NKB0OYY, PCO2, POCPO2, PO2ART, PO2, POCHCO3, QVO1WIK, HCO3, NBEA, PBEA, BEART, BE, THGBART, THB, SFX2YEQ, FVGG3IFF, Y8EXYJBT, O2SAT, FIO2  No results found for: SPECIAL  No results found for: CULTURE    Radiology:  XR FEMUR LEFT (MIN 2 VIEWS)    Result Date: 6/23/2022  1. Redemonstration of impacted, angulated intertrochanteric femur fracture. 2.  Findings compatible with old trauma in the distal femoral metaphysis and prior surgery in the proximal tibia. XR CHEST 1 VIEW    Result Date: 6/23/2022  Chronic findings in the chest without acute airspace disease identified. XR HIP 2-3 VW W PELVIS LEFT    Result Date: 6/22/2022  Impacted and angulated intertrochanteric left femur fracture.        Physical Examination:        General appearance:  alert, cooperative and no distress  Mental Status:  oriented to person, place and time and normal affect  Lungs:  clear to auscultation bilaterally, normal effort  Heart:  regular rate and rhythm, no murmur  Abdomen:  soft, nontender, nondistended, normal bowel sounds, no masses, hepatomegaly, splenomegaly  Extremities:  no edema, redness, tenderness in the calves  Skin:  no gross lesions, rashes, induration    Assessment:        Hospital Problems           Last Modified POA    * (Principal) Closed left hip fracture, initial encounter ([facility] [address] 6/23/2022 Yes    Alcoholism ([facility] [address] 6/23/2022 Yes    Overview Signed 6/23/2022  7:44 AM by WISAM Nunes NP     Last Assessment & Plan:   Formatting of this note might be different from the original.  Multivitamins   IV fluids         COPD without exacerbation ([facility] [address] 6/23/2022 Yes    Essential hypertension 6/23/2022 Yes    Overview Signed 6/23/2022  7:44 AM by WISAM Nunes NP     Last Assessment & Plan:   Formatting of this note is different from the original.  Blood pressures are mostly well controlled. Continue current medication regimen. Will monitor. BP Readings from Last 3 Encounters:   05/27/18 (!) 113/58   05/04/18 (!) 162/69   03/18/17 (!) 148/70         Mixed hyperlipidemia 6/23/2022 Yes    Overview Signed 6/23/2022  7:44 AM by WISAM Nunes NP     Last Assessment & Plan:   Formatting of this note might be different from the original.  Continue home meds         First degree AV block 6/23/2022 Yes    Chronic anticoagulation 6/23/2022 Yes    Pre-op evaluation 6/23/2022 Yes          Plan:        1. Surgery today  2. Continue home maintenance medications  3. Oxygen aerosols as ordered  4. GI and DVT prophylaxis  5. Monitor for signs and symptoms of withdrawal  6. PT and OT postop, advised she will likely require skilled nursing placement for rehab pending postoperative progress  7. Trend labs, correct electrolytes as needed  8. Trend H&H, patient has chronic anemia, monitor for postoperative losses.     Otilio Rosas, DO  6/24/2022  8:49 AM

## 2022-06-24 NOTE — FLOWSHEET NOTE
Received a call from pt dtr Helga Cassidy requesting that pt be transferred to [facility] to Dr. Rahel Mcclendon which is pt's ortho surgeon and all of pt's other providers are there and have a good relationship with pt. The dtr would like a call back  as soon as it can be arranged.  Her phone number is [phone]     Writer relayed message to [employer] NP and awaiting repsonse

## 2022-06-24 NOTE — PROGRESS NOTES
Physical Therapy  DATE: 2022    NAME: Sharonda Singh  MRN: [id number]   : 1945    Patient not seen this date for Physical Therapy due to:      [] Cancel by RN or physician due to:    [] Hemodialysis    [] Critical Lab Value Level     [] Blood transfusion in progress    [] Acute or unstable cardiovascular status   _MAP < 55 or more than >115  _HR < 40 or > 130    [] Acute or unstable pulmonary status   -FiO2 > 60%   _RR < 5 or >40    _O2 sats < 85%    [] Strict Bedrest    [x] Pt has acute intertochanteric femur fracture L hip & surgery planned for PM    [] Off Unit for testing       [] Pending imaging to R/O fracture    [] Refusal by Patient      [] Other      [] PT being discontinued at this time. Patient independent. No further needs. [] PT being discontinued at this time as the patient has been transferred to hospice care. No further needs.       [address], PT

## 2022-06-25 LAB
ABSOLUTE EOS #: <0.03 K/UL (ref 0–0.44)
ABSOLUTE IMMATURE GRANULOCYTE: 0.01 K/UL (ref 0–0.3)
ABSOLUTE LYMPH #: 1 K/UL (ref 1.1–3.7)
ABSOLUTE MONO #: 0.2 K/UL (ref 0.1–1.2)
ANION GAP SERPL CALCULATED.3IONS-SCNC: 11 MMOL/L (ref 9–17)
BASOPHILS # BLD: 0 % (ref 0–2)
BASOPHILS ABSOLUTE: <0.03 K/UL (ref 0–0.2)
BUN BLDV-MCNC: 10 MG/DL (ref 8–23)
BUN/CREAT BLD: 12 (ref 9–20)
CALCIUM SERPL-MCNC: 9 MG/DL (ref 8.6–10.4)
CHLORIDE BLD-SCNC: 100 MMOL/L (ref 98–107)
CO2: 21 MMOL/L (ref 20–31)
CREAT SERPL-MCNC: 0.86 MG/DL (ref 0.5–0.9)
EOSINOPHILS RELATIVE PERCENT: 0 % (ref 1–4)
GFR AFRICAN AMERICAN: >60 ML/MIN
GFR NON-AFRICAN AMERICAN: >60 ML/MIN
GFR SERPL CREATININE-BSD FRML MDRD: ABNORMAL ML/MIN/{1.73_M2}
GLUCOSE BLD-MCNC: 232 MG/DL (ref 70–99)
HCT VFR BLD CALC: 24.7 % (ref 36.3–47.1)
HEMOGLOBIN: 7.8 G/DL (ref 11.9–15.1)
IMMATURE GRANULOCYTES: 0 %
LYMPHOCYTES # BLD: 16 % (ref 24–43)
MCH RBC QN AUTO: 31.6 PG (ref 25.2–33.5)
MCHC RBC AUTO-ENTMCNC: 31.6 G/DL (ref 28.4–34.8)
MCV RBC AUTO: 100 FL (ref 82.6–102.9)
MONOCYTES # BLD: 3 % (ref 3–12)
NRBC AUTOMATED: 0 PER 100 WBC
PDW BLD-RTO: 13.2 % (ref 11.8–14.4)
PLATELET # BLD: 181 K/UL (ref 138–453)
PMV BLD AUTO: 8.5 FL (ref 8.1–13.5)
POTASSIUM SERPL-SCNC: 4 MMOL/L (ref 3.7–5.3)
RBC # BLD: 2.47 M/UL (ref 3.95–5.11)
SEG NEUTROPHILS: 80 % (ref 36–65)
SEGMENTED NEUTROPHILS ABSOLUTE COUNT: 5 K/UL (ref 1.5–8.1)
SODIUM BLD-SCNC: 132 MMOL/L (ref 135–144)
WBC # BLD: 6.2 K/UL (ref 3.5–11.3)

## 2022-06-25 PROCEDURE — 99232 SBSQ HOSP IP/OBS MODERATE 35: CPT | Performed by: INTERNAL MEDICINE

## 2022-06-25 PROCEDURE — 97530 THERAPEUTIC ACTIVITIES: CPT

## 2022-06-25 PROCEDURE — 80048 BASIC METABOLIC PNL TOTAL CA: CPT

## 2022-06-25 PROCEDURE — 97166 OT EVAL MOD COMPLEX 45 MIN: CPT

## 2022-06-25 PROCEDURE — 6360000002 HC RX W HCPCS: Performed by: ORTHOPAEDIC SURGERY

## 2022-06-25 PROCEDURE — 97162 PT EVAL MOD COMPLEX 30 MIN: CPT

## 2022-06-25 PROCEDURE — 1200000000 HC SEMI PRIVATE

## 2022-06-25 PROCEDURE — 2580000003 HC RX 258: Performed by: ORTHOPAEDIC SURGERY

## 2022-06-25 PROCEDURE — 6370000000 HC RX 637 (ALT 250 FOR IP): Performed by: ORTHOPAEDIC SURGERY

## 2022-06-25 PROCEDURE — 97110 THERAPEUTIC EXERCISES: CPT

## 2022-06-25 PROCEDURE — 97116 GAIT TRAINING THERAPY: CPT

## 2022-06-25 PROCEDURE — 85025 COMPLETE CBC W/AUTO DIFF WBC: CPT

## 2022-06-25 PROCEDURE — 6370000000 HC RX 637 (ALT 250 FOR IP): Performed by: INTERNAL MEDICINE

## 2022-06-25 PROCEDURE — 36415 COLL VENOUS BLD VENIPUNCTURE: CPT

## 2022-06-25 RX ORDER — CITALOPRAM 20 MG/1
20 TABLET ORAL NIGHTLY
Status: DISCONTINUED | OUTPATIENT
Start: 2022-06-25 | End: 2022-06-27 | Stop reason: HOSPADM

## 2022-06-25 RX ORDER — PANTOPRAZOLE SODIUM 40 MG/1
40 TABLET, DELAYED RELEASE ORAL
Status: DISCONTINUED | OUTPATIENT
Start: 2022-06-26 | End: 2022-06-27 | Stop reason: HOSPADM

## 2022-06-25 RX ORDER — METOPROLOL SUCCINATE 50 MG/1
50 TABLET, EXTENDED RELEASE ORAL DAILY
Status: DISCONTINUED | OUTPATIENT
Start: 2022-06-25 | End: 2022-06-27 | Stop reason: HOSPADM

## 2022-06-25 RX ORDER — ATORVASTATIN CALCIUM 10 MG/1
10 TABLET, FILM COATED ORAL DAILY
Status: DISCONTINUED | OUTPATIENT
Start: 2022-06-25 | End: 2022-06-27 | Stop reason: HOSPADM

## 2022-06-25 RX ORDER — ALBUTEROL SULFATE 90 UG/1
2 AEROSOL, METERED RESPIRATORY (INHALATION) EVERY 6 HOURS PRN
Status: DISCONTINUED | OUTPATIENT
Start: 2022-06-25 | End: 2022-06-27 | Stop reason: HOSPADM

## 2022-06-25 RX ORDER — FENOFIBRATE 160 MG/1
160 TABLET ORAL DAILY
Status: DISCONTINUED | OUTPATIENT
Start: 2022-06-25 | End: 2022-06-27 | Stop reason: HOSPADM

## 2022-06-25 RX ORDER — LANOLIN ALCOHOL/MO/W.PET/CERES
400 CREAM (GRAM) TOPICAL DAILY
Status: DISCONTINUED | OUTPATIENT
Start: 2022-06-25 | End: 2022-06-27 | Stop reason: HOSPADM

## 2022-06-25 RX ORDER — HYDROCODONE BITARTRATE AND ACETAMINOPHEN 5; 325 MG/1; MG/1
1 TABLET ORAL EVERY 4 HOURS PRN
Status: DISCONTINUED | OUTPATIENT
Start: 2022-06-25 | End: 2022-06-27 | Stop reason: HOSPADM

## 2022-06-25 RX ORDER — HYDROCODONE BITARTRATE AND ACETAMINOPHEN 5; 325 MG/1; MG/1
2 TABLET ORAL EVERY 4 HOURS PRN
Status: DISCONTINUED | OUTPATIENT
Start: 2022-06-25 | End: 2022-06-27 | Stop reason: HOSPADM

## 2022-06-25 RX ORDER — CYANOCOBALAMIN 1000 UG/ML
1000 INJECTION INTRAMUSCULAR; SUBCUTANEOUS
Status: DISCONTINUED | OUTPATIENT
Start: 2022-07-06 | End: 2022-06-27 | Stop reason: HOSPADM

## 2022-06-25 RX ORDER — DOCUSATE SODIUM 100 MG/1
100 CAPSULE, LIQUID FILLED ORAL DAILY PRN
Status: DISCONTINUED | OUTPATIENT
Start: 2022-06-25 | End: 2022-06-27 | Stop reason: HOSPADM

## 2022-06-25 RX ADMIN — FOLIC ACID 1 MG: 1 TABLET ORAL at 09:44

## 2022-06-25 RX ADMIN — SODIUM CHLORIDE: 9 INJECTION, SOLUTION INTRAVENOUS at 06:37

## 2022-06-25 RX ADMIN — MAGNESIUM OXIDE TAB 400 MG (241.3 MG ELEMENTAL MG) 400 MG: 400 (241.3 MG) TAB at 18:41

## 2022-06-25 RX ADMIN — METOPROLOL SUCCINATE 50 MG: 50 TABLET, EXTENDED RELEASE ORAL at 22:03

## 2022-06-25 RX ADMIN — HYDROCODONE BITARTRATE AND ACETAMINOPHEN 2 TABLET: 5; 325 TABLET ORAL at 17:48

## 2022-06-25 RX ADMIN — FENOFIBRATE 160 MG: 160 TABLET ORAL at 18:41

## 2022-06-25 RX ADMIN — HYDROCODONE BITARTRATE AND ACETAMINOPHEN 2 TABLET: 5; 325 TABLET ORAL at 06:32

## 2022-06-25 RX ADMIN — POLYETHYLENE GLYCOL 3350 17 G: 17 POWDER, FOR SOLUTION ORAL at 09:44

## 2022-06-25 RX ADMIN — THIAMINE HCL TAB 100 MG 100 MG: 100 TAB at 09:44

## 2022-06-25 RX ADMIN — DOCUSATE SODIUM 100 MG: 100 CAPSULE, LIQUID FILLED ORAL at 22:03

## 2022-06-25 RX ADMIN — CEFAZOLIN 2000 MG: 10 INJECTION, POWDER, FOR SOLUTION INTRAVENOUS at 04:42

## 2022-06-25 RX ADMIN — HYDROCODONE BITARTRATE AND ACETAMINOPHEN 2 TABLET: 5; 325 TABLET ORAL at 12:17

## 2022-06-25 RX ADMIN — APIXABAN 5 MG: 5 TABLET, FILM COATED ORAL at 22:03

## 2022-06-25 RX ADMIN — CITALOPRAM HYDROBROMIDE 20 MG: 20 TABLET ORAL at 22:03

## 2022-06-25 RX ADMIN — HYDROCODONE BITARTRATE AND ACETAMINOPHEN 2 TABLET: 5; 325 TABLET ORAL at 22:44

## 2022-06-25 RX ADMIN — ATORVASTATIN CALCIUM 10 MG: 10 TABLET, FILM COATED ORAL at 18:41

## 2022-06-25 RX ADMIN — DOCUSATE SODIUM 100 MG: 100 CAPSULE, LIQUID FILLED ORAL at 09:44

## 2022-06-25 ASSESSMENT — PAIN DESCRIPTION - LOCATION
LOCATION: HIP

## 2022-06-25 ASSESSMENT — PAIN DESCRIPTION - ONSET
ONSET: ON-GOING
ONSET: ON-GOING

## 2022-06-25 ASSESSMENT — PAIN SCALES - GENERAL
PAINLEVEL_OUTOF10: 7
PAINLEVEL_OUTOF10: 4
PAINLEVEL_OUTOF10: 7

## 2022-06-25 ASSESSMENT — PAIN DESCRIPTION - ORIENTATION
ORIENTATION: LEFT

## 2022-06-25 ASSESSMENT — PAIN DESCRIPTION - DESCRIPTORS
DESCRIPTORS: ACHING
DESCRIPTORS: ACHING
DESCRIPTORS: SHARP

## 2022-06-25 ASSESSMENT — PAIN DESCRIPTION - FREQUENCY
FREQUENCY: INTERMITTENT
FREQUENCY: INTERMITTENT

## 2022-06-25 ASSESSMENT — PAIN - FUNCTIONAL ASSESSMENT
PAIN_FUNCTIONAL_ASSESSMENT: PREVENTS OR INTERFERES WITH MANY ACTIVE NOT PASSIVE ACTIVITIES
PAIN_FUNCTIONAL_ASSESSMENT: PREVENTS OR INTERFERES WITH ALL ACTIVE AND SOME PASSIVE ACTIVITIES
PAIN_FUNCTIONAL_ASSESSMENT: PREVENTS OR INTERFERES WITH ALL ACTIVE AND SOME PASSIVE ACTIVITIES

## 2022-06-25 ASSESSMENT — PAIN DESCRIPTION - PAIN TYPE
TYPE: SURGICAL PAIN
TYPE: SURGICAL PAIN

## 2022-06-25 NOTE — PROGRESS NOTES
Physical Therapy  Facility/Department: [facility]  Physical Therapy Initial Assessment    Name: Trenton Hackett  : 1945  MRN: [id number]  Date of Service: 2022    Discharge Recommendations:  Patient would benefit from continued therapy after discharge       Pt presented to ED on 22 complaining of pain to the left hip. She was walking in her kitchen with her walker and fell. Imaging revealed acute intertochanteric femur fracture L hip. Pt admitted for further medical management of Closed left hip fracture    On 22, Pt underwent Open treatment of Left hip intertrochanteric femur fracture with cephalomedullary nail pinning    RN reports patient is medically stable for therapy treatment this date. Chart reviewed prior to treatment and patient is agreeable for therapy. Patient Diagnosis(es): The encounter diagnosis was Closed fracture of left hip, initial encounter ([facility] [address] Past Medical History:  has a past medical history of Atrial fibrillation ([facility] [address] and HTN (hypertension). Past Surgical History:  has a past surgical history that includes Appendectomy and hip surgery (Left, 2022). Assessment   Body Structures, Functions, Activity Limitations Requiring Skilled Therapeutic Intervention: Decreased functional mobility ; Decreased ADL status; Decreased ROM; Decreased strength;Decreased safe awareness;Decreased endurance;Decreased balance;Decreased posture  Assessment: Pt with deficits of ROM/strength of L hip & LLE, bed mobility, transfers, ambulation, balance, safety awareness and endurance this session,  & requires 2 assist for safe functional mobility, transfers & gait. , & is decline compared to prior level of function. With current deficits, Pt HIGH risk for falls & requires continued PT to maximize independence with functional mobility, balance, safety awareness & activity tolerance to restore independence & overall tolerance of ADL's. Pt currently functioning below baseline. Would suggest additional therapy at time of discharge to maximize long term outcomes and prevent re-admission. Please refer to AM-PAC score for current level of function. Therapy Prognosis: Good  Decision Making: Medium Complexity  Exam: ROM, MMT, functional mobility, activity tolerance, Balance, & [employer] AM-PAC 6 Clicks Basic Mobility  Clinical Presentation: evolving  Requires PT Follow-Up: Yes  Activity Tolerance  Activity Tolerance: Patient limited by fatigue;Patient limited by pain; Patient limited by endurance     Plan   Plan  Current Treatment Recommendations: Strengthening,ROM,Balance training,Transfer training,ADL/Self-care training,Endurance training,Gait training,Home exercise program,Safety education & training,Patient/Caregiver education & training  Plan Comment: 1-2x/D, 6-7D/week  Safety Devices  Type of Devices: Call light within reach,Chair alarm in place,Gait belt,Heels elevated for pressure relief,Patient at risk for falls,Left in chair,Nurse notified     Restrictions  Restrictions/Precautions  Restrictions/Precautions: General Precautions,Fall Risk,Weight Bearing  Required Braces or Orthoses?: Yes  Lower Extremity Weight Bearing Restrictions  Left Lower Extremity Weight Bearing: Weight Bearing As Tolerated  Required Braces or Orthoses  Left Lower Extremity Brace:  (pt normally wears elevated shoe on LLE due to shorter leg length(shoe not here at present))  Position Activity Restriction  Other position/activity restrictions: FWBAT LLE, ABD pillow, Heels off bed at all times, fall & seizure precautions, ext catheter, LUE IV, pt has leg length discrepancy of LLE, ALARMS     Subjective   General  Chart Reviewed: Yes  Patient assessed for rehabilitation services?: Yes  Additional Pertinent Hx: atrial fib, HTN, ETOH abuse  General Comment  Comments: RN okays PT  Subjective  Subjective: Pt agreeable to PT, c/o pain L hip rated         Social/Functional History  Social/Functional History  Lives With: Alone  Type of Home: Condo  Home Layout: One level  Home Access: Ramped entrance  Bathroom Shower/Tub: Walk-in shower  Bathroom Equipment: Shower chair,Grab bars in [address]: [place] [address] Help From: Family (Pt has supportive daughter who lives near her, is out of town until next week)  ADL Assistance: Independent  Homemaking Assistance: Needs assistance (Pt has cleaning sevice every other week)  Homemaking Responsibilities: Yes  Ambulation Assistance: Independent (used R/walker)  Transfer Assistance: Independent  Active : No  Patient's  Info: daughter  Occupation: Retired  Type of Occupation: [employer]: TV, socializing wiht friends  Additional Comments: Pt fell in kitchen when she tripped over [employer] open door, last fall before that in December, 2021  Vision/Hearing  Vision  Vision: Impaired  Vision Exceptions: Wears glasses for reading;Wears glasses for distance (glasses are not here)  Hearing  Hearing: Exceptions to [facility]  Hearing Exceptions: Hard of hearing/hearing concerns; No hearing aid    Cognition   Orientation  Overall Orientation Status: Within Functional Limits  Cognition  Overall Cognitive Status: Exceptions  Arousal/Alertness: Appropriate responses to stimuli  Following Commands:  Follows all commands without difficulty  Attention Span: Appears intact  Memory: Appears intact  Safety Judgement: Decreased awareness of need for safety;Decreased awareness of need for assistance  Problem Solving: Decreased awareness of errors;Assistance required to identify errors made;Assistance required to correct errors made  Insights: Decreased awareness of deficits  Initiation: Requires cues for some  Sequencing: Does not require cues     Objective   Heart Rate: 84  Heart Rate Source: Monitor  BP: (!) 123/48  BP Location: Right Arm  BP Method: Automatic  MAP (Calculated): 73  Resp: 18  SpO2: 100 %  O2 Device: None (Room air) Observation/Palpation  Posture: Fair  Observation: pt resting in bed, appears comfortable  Edema: L hip  Scar: incision at left hip covered by intact post op dressing        AROM RLE (degrees)  RLE AROM: WFL  AROM LLE (degrees)  LLE AROM : Exceptions  LLE General AROM: L hip flex 0-30 & Abd 0-10 assisted  AROM RUE (degrees)  RUE General AROM: see OT assessment  AROM LUE (degrees)  LUE General AROM: see OT assessment  Strength RLE  Strength RLE: WFL  Strength LLE  Comment: deferred hip, knee 3-/5  Strength RUE  Comment: see OT assessment  Strength LUE  Comment: see OT assessment     Sensation  Overall Sensation Status: WFL (pt denies any paresthesias)     Bed mobility  Rolling to Right: Moderate assistance  Supine to Sit: Maximum assistance  Scooting: Moderate assistance  Bed Mobility Comments: Max verbal instruction/tactile assist for UE hand placement on rail and proper log rolling tech + support of LE's to come out to EOB , use of UB to scoot completely out to EOB with B foot placement to establish safe sitting balance, pursed lip breathing,  pacing, assist with line mgt,  with increased time needed all to increase safety & reduce fall risk  Transfers  Sit to Stand: Moderate Assistance;2 Person Assistance(x 2 in order to fit brief & ext catheter placement before ambulation)  Stand to sit: Moderate Assistance;2 Person Assistance  Bed to Chair: Moderate assistance;2 Person Assistance  Stand Pivot Transfers: Moderate Assistance;2 Person Assistance  Lateral Transfers: Moderate Assistance;2 Person Assistance  Comment: MOD VC+ tactile assist on correct use of upper body for safe sit/stand + to back all way back to surface with walker until she feels touch behind legs & to ensure she reaches with UB support to arms of chair  Ambulation  WB Status: WBAT LLE  Ambulation  Surface: level tile  Device: Rolling Walker  Assistance:  Moderate assistance  Quality of Gait: step to pattern with MOD cues for dami/sequencing & to keep walker close at all times & to amb inside base of walker & upright posture for safety/scanning  Distance: 3ft  Comments: Pt has significatnt leg length discry(LLE shorter), & typically wears elevated shoe but is not here at present     Balance  Sitting - Static: Good  Sitting - Dynamic: Good;-  Standing - Static: Fair (R/W)  Standing - Dynamic: Fair;- (R/W)  Exercises  Circulation/Endurance Exercises: ankle pumps x 20  Pressure Relief Exercises: seated WS x 10, glut sets x 10    All lines intact, call light within reach, and patient positioned comfortably at end of treatment. All patient needs addressed prior to ending therapy session. OutComes Score                                                  AM-PAC Score  AM-PAC Inpatient Mobility Raw Score : 10 (06/25/22 0858)  AM-PAC Inpatient T-Scale Score : 32.29 (06/25/22 0858)  Mobility Inpatient CMS 0-100% Score: 76.75 (06/25/22 9304)  Mobility Inpatient CMS G-Code Modifier : CL (06/25/22 5405)          Goals  Short Term Goals  Time Frame for Short term goals: 12 visits  Short term goal 1: Inc bed-mobility & transfers to independent to enable pt to safely get in/OOB & chair to return to PLOF & decrease risk for falls; Short term goal 2: Inc gait to amb 100ft or > indep w/ RW to enable pt to return to previous level of independence & able to demonstrate indep/ safe use of RW in functional activities including approaching surfaces and turning to sit  Short term goal 3:  Inc ROM L hip to [facility] & strength LLE to [facility] & standing balance to good with device to facilitate pt independence for performance of ADL's & functional mobility, & reduce fall risk  Short term goal 4: Pt able to tolerate 30-40 min of activity to include 15-20 reps of ex, NMR & functional mobility with device to facilitate activity tolerance to [facility]  Short term goal 5: Ed pt on home ex's, safety, balance & endurance training, pressure relief with Pt able to demonstrate effective pressure relief techniques, fall prevention, & issue written Pt Ed       Education  Patient Education  Education Given To: Patient  Education Provided: Role of Therapy;Plan of Care  Education Provided Comments: Ed pt on functional mobility, safety awareness, importance of being up & OOB to regain strength, & prevention of sedentary complications, circulation ex's,  pressure relief & optimal breathing techniques  Education Method: Demonstration;Verbal  Education Outcome: Verbalized understanding;Continued education needed         Therapy Time   Individual Concurrent Group Co-treatment   Time In 0750        Time Out  0900        Minutes  70          Additional 10 minutes for chart review      Treatment time: 60 minutes         [address], PT

## 2022-06-25 NOTE — PROGRESS NOTES
[facility]  Office: [address], DO, Leoncio Kuhn, DO, Garret Ortiz, DO, Sukhi Marie, DO, Jose Carlos Vela MD, Kaitlin Salazar MD, Kumar Catherine MD, Edwina Teresa MD, Sumit oSlomon MD, Keyana Church MD, Blossom Valle MD, Radha Jones, DO, Simon Li MD,  Lai Bates, DO, Belinda Eduardo MD, Alok Velasco MD, Meaghan Carrizales, DO, Marii Gibson MD, Ele Rose MD, John Kuo, DO, Verónica Catherine MD, Adry Bravo MD, Sergei Hermosillo [facility], [facility], CNP, Marcela Gonzalez, CNP, Virgen Galvan, CNP, Arlene Eagle, CNP, Teodoro Pitt, CNP, Christiano Valencia PA-C, Xiao Ruiz, DNP, Amelia Steel, CNP, Tessa Banegas, CNP, Alex Isaac, CNP, Kala Lindsey, CNS, Huan Tsang, DNP, Brinda Dawson, CNP, Eduardo Carrizales, [facility], Christine Counter, [facility]    Progress Note    6/25/2022    8:11 AM    Name:   Carole Saucedo  MRN:     [id number]     Acct:      [de-identified]   Room:   210/2107-01   Day:  3  Admit Date:  6/22/2022 10:28 PM    PCP:   No primary care provider on file. Code Status:  Full Code    Subjective:     C/C:   Chief Complaint   Patient presents with    Fall    Hip Pain     Interval History Status: improved. Patient is resting comfortably denies any complaints of chest pain, shortness of breath, nausea vomiting, fevers or chills or acute complaints. Postoperative pain reasonably controlled    Brief History: This is a 77-year-old female that ambulates with a walker at home and had an accidental fall when she tripped over her walker. She had immediate onset of hip pain and was brought to the emergency room. She was found to have a closed left hip fracture and is admitted for orthopedic evaluation management.     Patient underwent ORIF of hip on the 24th    Review of Systems:     Constitutional:  negative for chills, fevers, sweats  Respiratory:  negative for cough, dyspnea on exertion, shortness of breath, wheezing  Cardiovascular:  negative for chest pain, chest pressure/discomfort, lower extremity edema, palpitations  Gastrointestinal:  negative for abdominal pain, constipation, diarrhea, nausea, vomiting  Neurological:  negative for dizziness, headache    Medications: Allergies: Allergies   Allergen Reactions    Sulfamethoxazole-Trimethoprim      Other reaction(s): GI Disturbance  This agent causes hyperkalemia and acute kidney injury in this patient       Current Meds:   Scheduled Meds:    polyethylene glycol  17 g Oral Daily    docusate sodium  100 mg Oral BID    thiamine  100 mg Oral Daily    folic acid  1 mg Oral Daily     Continuous Infusions:    sodium chloride 75 mL/hr at 22 0637     PRN Meds: morphine, HYDROcodone 5 mg - acetaminophen, HYDROcodone 5 mg - acetaminophen, potassium chloride **OR** potassium alternative oral replacement **OR** potassium chloride, magnesium sulfate, ondansetron **OR** ondansetron, acetaminophen **OR** acetaminophen    Data:     Past Medical History:   has a past medical history of Atrial fibrillation ([address] and HTN (hypertension). Social History:   reports that she quit smoking about 39 years ago. She has never used smokeless tobacco. She reports current alcohol use. She reports that she does not use drugs. Family History:   Family History   Problem Relation Age of Onset    Heart Disease Mother     Cancer Father     Hypertension Sister     Stroke Sister        Vitals:  BP (!) 144/51   Pulse 82   Temp 97.5 °F (36.4 °C) (Oral)   Resp 16   Ht 5' 6\" (1.676 m)   Wt 161 lb (73 kg)   SpO2 97%   BMI 25.99 kg/m²   Temp (24hrs), Av.6 °F (36.4 °C), Min:96.8 °F (36 °C), Max:98.1 °F (36.7 °C)    No results for input(s): POCGLU in the last 72 hours. I/O (24Hr):     Intake/Output Summary (Last 24 hours) at 2022 0811  Last data filed at 2022 0646  Gross per 24 hour   Intake 1653.23 ml   Output 950 ml   Net 703.23 ml       Labs:  Hematology:  Recent Labs     06/22/22 2230 06/23/22  0714 06/24/22  0624   WBC 7.5  --  5.4   RBC 3.40*  --  2.97*   HGB 10.7*  --  9.3*   HCT 33.5*  --  28.6*   MCV 98.5  --  96.3   MCH 31.5  --  31.3   MCHC 31.9  --  32.5   RDW 13.3  --  13.2     --  188   MPV 8.9  --  8.2   INR 1.2 1.1  --      Chemistry:  Recent Labs     06/22/22 2230 06/23/22  0714 06/24/22  0624   * 131* 134*   K 3.7 4.3 4.4   CL 97* 99 104   CO2 19* 21 21   GLUCOSE 110* 113* 115*   BUN 23 16 11   CREATININE 0.97* 0.76 0.74   ANIONGAP 15 11 9   LABGLOM 55* >60 >60   GFRAA >60 >60 >60   CALCIUM 9.3 8.9 8.8     Recent Labs     06/22/22 2230   PROT 6.7   LABALBU 4.3   AST 16   ALT 8   ALKPHOS 32*   BILITOT 0.22*     ABG:No results found for: POCPH, PHART, PH, POCPCO2, PNL9NJF, PCO2, POCPO2, PO2ART, PO2, POCHCO3, BNW7SBG, HCO3, NBEA, PBEA, BEART, BE, THGBART, THB, MHT5WPB, DPLM2SSP, R3UDDUWZ, O2SAT, FIO2  No results found for: SPECIAL  No results found for: CULTURE    Radiology:  XR HIP LEFT (2-3 VIEWS)    Result Date: 6/24/2022  Status post left hip internal fixation of a hip fracture     XR FEMUR LEFT (MIN 2 VIEWS)    Result Date: 6/23/2022  1. Redemonstration of impacted, angulated intertrochanteric femur fracture. 2.  Findings compatible with old trauma in the distal femoral metaphysis and prior surgery in the proximal tibia. XR CHEST 1 VIEW    Result Date: 6/23/2022  Chronic findings in the chest without acute airspace disease identified. XR HIP 2-3 VW W PELVIS LEFT    Result Date: 6/22/2022  Impacted and angulated intertrochanteric left femur fracture.        Physical Examination:        General appearance:  alert, cooperative and no distress  Mental Status:  oriented to person, place and time and normal affect  Lungs:  clear to auscultation bilaterally, normal effort  Heart:  regular rate and rhythm, no murmur  Abdomen:  soft, nontender, nondistended, normal bowel sounds, no masses, hepatomegaly, splenomegaly  Extremities:  no edema, redness, tenderness in the calves  Skin:  no gross lesions, rashes, induration    Assessment:        Hospital Problems           Last Modified POA    * (Principal) Closed left hip fracture, initial encounter ([facility] [address] 6/23/2022 Yes    Alcoholism ([facility] [address] 6/23/2022 Yes    Overview Signed 6/23/2022  7:44 AM by WISAM Ambrose NP     Last Assessment & Plan:   Formatting of this note might be different from the original.  Multivitamins   IV fluids         COPD without exacerbation ([facility] [address] 6/23/2022 Yes    Essential hypertension 6/23/2022 Yes    Overview Signed 6/23/2022  7:44 AM by WISAM Ambrose NP     Last Assessment & Plan:   Formatting of this note is different from the original.  Blood pressures are mostly well controlled. Continue current medication regimen. Will monitor. BP Readings from Last 3 Encounters:   05/27/18 (!) 113/58   05/04/18 (!) 162/69   03/18/17 (!) 148/70         Mixed hyperlipidemia 6/23/2022 Yes    Overview Signed 6/23/2022  7:44 AM by WISAM Ambrose NP     Last Assessment & Plan:   Formatting of this note might be different from the original.  Continue home meds         First degree AV block 6/23/2022 Yes    Chronic anticoagulation 6/23/2022 Yes    Pre-op evaluation 6/23/2022 Yes          Plan:        1. PT and OT per postoperative protocol  2. Monitor and control blood pressure  3. Check labs today  4. Oxygen and aerosols as needed  5. Incentive spirometry  6. GI and DVT prophylaxis, resume home Eliquis when okay with orthopedics  7. Social service for discharge planning, will likely benefit from short-term rehab stay  8. Monitor for signs or symptoms of withdrawal  9.  Discussed possible need for transfusion, patient has had transfusion of the past and consented to blood products if needed    Tess Perdue DO  6/25/2022  8:11 AM

## 2022-06-25 NOTE — CONSENT
Informed Consent for Blood Component Transfusion Note    I have discussed with the patient the rationale for blood component transfusion; its benefits in treating or preventing fatigue, organ damage, or death; and its risk which includes mild transfusion reactions, rare risk of blood borne infection, or more serious but rare reactions. I have discussed the alternatives to transfusion, including the risk and consequences of not receiving transfusion. The patient had an opportunity to ask questions and had agreed to proceed with transfusion of blood components.     Electronically signed by Denyce Dakin, DO on 6/25/22 at 5:37 PM EDT

## 2022-06-25 NOTE — RT PROTOCOL NOTE
RT Inhaler-Nebulizer Bronchodilator Protocol Note    There is a bronchodilator order in the chart from a provider indicating to follow the RT Bronchodilator Protocol and there is an Initiate RT Inhaler-Nebulizer Bronchodilator Protocol order as well (see protocol at bottom of note). CXR Findings:  No results found. The findings from the last RT Protocol Assessment were as follows:   History Pulmonary Disease: Smoker 15 pack years or more  Respiratory Pattern: Regular pattern and RR 12-20 bpm  Breath Sounds: Clear breath sounds  Cough: Strong, spontaneous, non-productive  Indication for Bronchodilator Therapy: On home bronchodilators  Bronchodilator Assessment Score: 1    Aerosolized bronchodilator medication orders have been revised according to the RT Inhaler-Nebulizer Bronchodilator Protocol below. Respiratory Therapist to perform RT Therapy Protocol Assessment initially then follow the protocol. Repeat RT Therapy Protocol Assessment PRN for score 0-3 or on second treatment, BID, and PRN for scores above 3. No Indications - adjust the frequency to every 6 hours PRN wheezing or bronchospasm, if no treatments needed after 48 hours then discontinue using Per Protocol order mode. If indication present, adjust the RT bronchodilator orders based on the Bronchodilator Assessment Score as indicated below. Use Inhaler orders unless patient has one or more of the following: on home nebulizer, not able to hold breath for 10 seconds, is not alert and oriented, cannot activate and use MDI correctly, or respiratory rate 25 breaths per minute or more, then use the equivalent nebulizer order(s) with same Frequency and PRN reasons based on the score. If a patient is on this medication at home then do not decrease Frequency below that used at home.     0-3 - enter or revise RT bronchodilator order(s) to equivalent RT Bronchodilator order with Frequency of every 4 hours PRN for wheezing or increased work of breathing using Per Protocol order mode. 4-6 - enter or revise RT Bronchodilator order(s) to two equivalent RT bronchodilator orders with one order with BID Frequency and one order with Frequency of every 4 hours PRN wheezing or increased work of breathing using Per Protocol order mode. 7-10 - enter or revise RT Bronchodilator order(s) to two equivalent RT bronchodilator orders with one order with TID Frequency and one order with Frequency of every 4 hours PRN wheezing or increased work of breathing using Per Protocol order mode. 11-13 - enter or revise RT Bronchodilator order(s) to one equivalent RT bronchodilator order with QID Frequency and an Albuterol order with Frequency of every 4 hours PRN wheezing or increased work of breathing using Per Protocol order mode. Greater than 13 - enter or revise RT Bronchodilator order(s) to one equivalent RT bronchodilator order with every 4 hours Frequency and an Albuterol order with Frequency of every 2 hours PRN wheezing or increased work of breathing using Per Protocol order mode. RT to enter RT Home Evaluation for COPD & MDI Assessment order using Per Protocol order mode.     Electronically signed by Pauly Rothman RCP on 6/25/2022 at 5:47 PM

## 2022-06-25 NOTE — PLAN OF CARE
Problem: Discharge Planning  Goal: Discharge to home or other facility with appropriate resources  Outcome: Progressing     Problem: Pain  Goal: Verbalizes/displays adequate comfort level or baseline comfort level  Outcome: Progressing     Problem: Skin/Tissue Integrity  Goal: Absence of new skin breakdown  Description: 1. Monitor for areas of redness and/or skin breakdown  2. Assess vascular access sites hourly  3. Every 4-6 hours minimum:  Change oxygen saturation probe site  4. Every 4-6 hours:  If on nasal continuous positive airway pressure, respiratory therapy assess nares and determine need for appliance change or resting period.   Outcome: Progressing     Problem: Safety - Adult  Goal: Free from fall injury  Outcome: Progressing  Flowsheets (Taken 6/25/2022 1335)  Free From Fall Injury: Instruct family/caregiver on patient safety     Problem: ABCDS Injury Assessment  Goal: Absence of physical injury  Outcome: Progressing  Flowsheets  Taken 6/25/2022 1337  Absence of Physical Injury: Implement safety measures based on patient assessment  Taken 6/25/2022 1335  Absence of Physical Injury: Implement safety measures based on patient assessment     Problem: Respiratory - Adult  Goal: Achieves optimal ventilation and oxygenation  Outcome: Progressing     Problem: Skin/Tissue Integrity - Adult  Goal: Skin integrity remains intact  Outcome: Progressing  Flowsheets (Taken 6/25/2022 1336)  Skin Integrity Remains Intact:   Monitor for areas of redness and/or skin breakdown   Assess vascular access sites hourly  Goal: Incisions, wounds, or drain sites healing without S/S of infection  Outcome: Progressing  Goal: Oral mucous membranes remain intact  Outcome: Progressing     Problem: Musculoskeletal - Adult  Goal: Return mobility to safest level of function  Outcome: Progressing  Goal: Maintain proper alignment of affected body part  Outcome: Progressing  Goal: Return ADL status to a safe level of function  Outcome: Progressing

## 2022-06-25 NOTE — PLAN OF CARE
Problem: Pain  Goal: Verbalizes/displays adequate comfort level or baseline comfort level  Outcome: Progressing  Note: Patient has as needed pain control and will ask for it. Problem: Safety - Adult  Goal: Free from fall injury  Outcome: Progressing  Note: Patient will be free from falls this shift and is aware of personal limits.

## 2022-06-25 NOTE — PROGRESS NOTES
Occupational Therapy  Facility/Department: [facility] MED SURG  Occupational Therapy Initial Assessment    Name: Adele Martinez  : 1945  MRN: [id number]  Date of Service: 2022    Discharge Recommendations:  Patient would benefit from continued therapy after discharge. Pt currently functioning below baseline. Recommend daily inpatient skilled therapy at time of discharge to maximize long term outcomes and prevent re-admission. Please refer to AM-PAC score for current level of function. Patient Diagnosis(es): The encounter diagnosis was Closed fracture of left hip, initial encounter ([facility] [address] Past Medical History:  has a past medical history of Atrial fibrillation ([facility] [address] and HTN (hypertension). Past Surgical History:  has a past surgical history that includes Appendectomy and hip surgery (Left, 2022). Assessment   Performance deficits / Impairments: Decreased functional mobility ; Decreased safe awareness;Decreased ADL status; Decreased high-level IADLs;Decreased endurance  Prognosis: Good  Decision Making: Medium Complexity  REQUIRES OT FOLLOW-UP: Yes  Activity Tolerance  Activity Tolerance: Patient Tolerated treatment well        Plan   Plan  Times per Week: 5-6x/week  Times per Day: Daily  Current Treatment Recommendations: Functional mobility training,Safety education & training,Self-Care / ADL     Restrictions  Restrictions/Precautions  Restrictions/Precautions: General Precautions,Fall Risk,Weight Bearing  Required Braces or Orthoses?: Yes  Lower Extremity Weight Bearing Restrictions  Left Lower Extremity Weight Bearing: Weight Bearing As Tolerated  Required Braces or Orthoses  Left Lower Extremity Brace:  (pt normally wears elevated shoe on LLE due to shorter leg length(shoe not here at present))  Position Activity Restriction  Other position/activity restrictions: FWBAT LLE, ABD pillow, Heels off bed at all times, fall & seizure precautions, ext catheter, LUE IV, pt has leg length discrepancy of LLE, ALARMS    Subjective   General  Patient assessed for rehabilitation services?: Yes     Social/Functional History  Social/Functional History  Lives With: Alone  Type of Home: Condo  Home Layout: One level  Home Access: Ramped entrance  Bathroom Shower/Tub: Walk-in shower  Bathroom Equipment: Shower chair,Grab bars in [address] [place]: Walker, [address] Help From: Family (Pt has supportive daughter who lives near her, is out of town until next week)  ADL Assistance: Independent  Homemaking Assistance: Needs assistance (Pt has cleaning sevice every other week)  Homemaking Responsibilities: Yes  Ambulation Assistance: Independent (used R/walker)  Transfer Assistance: Independent  Active : No  Patient's  Info: daughter  Occupation: Retired  Type of Occupation: [address]: TV, socializing with friends  Additional Comments: Pt fell in kitchen when she tripped over [employer] open door, last fall before that in December, 2021       Objective   Heart Rate: 84  Heart Rate Source: Monitor  BP: (!) 123/48  BP Location: Right Arm  BP Method: Automatic  MAP (Calculated): 73  Resp: 18  SpO2: 100 %  O2 Device: None (Room air)  Vision Exceptions: Wears glasses for reading;Wears glasses for distance (glasses are not here)  Hearing: Exceptions to [facility]  Hearing Exceptions: Hard of hearing/hearing concerns; No hearing aid       Observation/Palpation  Posture: Fair  Observation: Pt in chair upon arrival eating lunch and requesting assistance back to bed  Edema: L hip  Scar: incision at left hip covered by intact post op dressing  Safety Devices  Type of Devices: Bed alarm in place;Call light within reach;Gait belt;Left in bed;Nurse notified  Gait  Overall Level of Assistance: Moderate assistance (Pt able to take several steps from bedside chair to bed with RW and Mod A. Pt unable to ambulate further distance d/t significant leg length discrepency.   Pt has built up shoe at home, but does not have with her)     AROM: Within functional limits  Strength: Within functional limits  Coordination: Within functional limits  Tone: Normal  ADL  Feeding: Independent;Setup  Grooming: Modified independent ;Setup (In seated)  UE Bathing: Stand by assistance;Setup  LE Bathing: Moderate assistance;Setup  UE Dressing: Stand by assistance;Setup  LE Dressing: Moderate assistance;Maximum assistance;Setup  Toileting: Other (Comment) (chuyita)  Additional Comments: Pt is limited by surgical pain and standing rafael        Bed mobility  Sit to Supine: Maximum assistance  Scooting: Moderate assistance  Bed Mobility Comments: Pt required assist to bring LEs into bed  Transfers  Sit to stand: Moderate assistance  Stand to sit: Moderate assistance  Transfer Comments: Pt demo'd good transfer tech     Cognition  Overall Cognitive Status: Exceptions  Arousal/Alertness: Appropriate responses to stimuli  Following Commands:  Follows all commands without difficulty  Attention Span: Appears intact  Memory: Appears intact  Safety Judgement: Decreased awareness of need for safety;Decreased awareness of need for assistance  Problem Solving: Decreased awareness of errors;Assistance required to identify errors made;Assistance required to correct errors made  Insights: Decreased awareness of deficits  Initiation: Requires cues for some  Sequencing: Does not require cues                  Education Given To: Patient  Education Provided: Role of Therapy;Plan of Care;Transfer Training;ADL Adaptive Strategies                        AM-PAC Score: 16             Goals  Short Term Goals  Time Frame for Short term goals: By d/c, pt will  Short Term Goal 1: demo SBA for bed mob tasks with use of bedrails/controls as needed  Short Term Goal 2: demo Min A for ADL transfers with good safety  Short Term Goal 3: demo Min A for functional mob at room distance with good safety/pacing  Short Term Goal 4: demo SBA for UB ADLs and Mod A for LB ADLs with use of AD/DME as needed  Short Term Goal 5: demo and verb good understanding of all educ provided on fall prevention, EC/WS strats, and possible equip recs       Therapy Time   Individual Concurrent Group Co-treatment   Time In 1305 (plus 10 min chart review)         Time Out 1342         Minutes [address] [place] [address]

## 2022-06-26 LAB
ABSOLUTE EOS #: 0.24 K/UL (ref 0–0.44)
ABSOLUTE IMMATURE GRANULOCYTE: 0.01 K/UL (ref 0–0.3)
ABSOLUTE LYMPH #: 1.68 K/UL (ref 1.1–3.7)
ABSOLUTE MONO #: 0.48 K/UL (ref 0.1–1.2)
ANION GAP SERPL CALCULATED.3IONS-SCNC: 8 MMOL/L (ref 9–17)
BASOPHILS # BLD: 1 % (ref 0–2)
BASOPHILS ABSOLUTE: 0.03 K/UL (ref 0–0.2)
BUN BLDV-MCNC: 10 MG/DL (ref 8–23)
BUN/CREAT BLD: 14 (ref 9–20)
CALCIUM SERPL-MCNC: 9.3 MG/DL (ref 8.6–10.4)
CHLORIDE BLD-SCNC: 103 MMOL/L (ref 98–107)
CO2: 26 MMOL/L (ref 20–31)
CREAT SERPL-MCNC: 0.73 MG/DL (ref 0.5–0.9)
EOSINOPHILS RELATIVE PERCENT: 5 % (ref 1–4)
GFR AFRICAN AMERICAN: >60 ML/MIN
GFR NON-AFRICAN AMERICAN: >60 ML/MIN
GFR SERPL CREATININE-BSD FRML MDRD: ABNORMAL ML/MIN/{1.73_M2}
GLUCOSE BLD-MCNC: 110 MG/DL (ref 70–99)
HCT VFR BLD CALC: 22.7 % (ref 36.3–47.1)
HEMOGLOBIN: 7.2 G/DL (ref 11.9–15.1)
IMMATURE GRANULOCYTES: 0 %
LYMPHOCYTES # BLD: 32 % (ref 24–43)
MCH RBC QN AUTO: 31.4 PG (ref 25.2–33.5)
MCHC RBC AUTO-ENTMCNC: 31.7 G/DL (ref 28.4–34.8)
MCV RBC AUTO: 99.1 FL (ref 82.6–102.9)
MONOCYTES # BLD: 9 % (ref 3–12)
NRBC AUTOMATED: 0 PER 100 WBC
PDW BLD-RTO: 13.2 % (ref 11.8–14.4)
PLATELET # BLD: 189 K/UL (ref 138–453)
PMV BLD AUTO: 8.6 FL (ref 8.1–13.5)
POTASSIUM SERPL-SCNC: 3.9 MMOL/L (ref 3.7–5.3)
RBC # BLD: 2.29 M/UL (ref 3.95–5.11)
SEG NEUTROPHILS: 54 % (ref 36–65)
SEGMENTED NEUTROPHILS ABSOLUTE COUNT: 2.86 K/UL (ref 1.5–8.1)
SODIUM BLD-SCNC: 137 MMOL/L (ref 135–144)
WBC # BLD: 5.3 K/UL (ref 3.5–11.3)

## 2022-06-26 PROCEDURE — 97110 THERAPEUTIC EXERCISES: CPT

## 2022-06-26 PROCEDURE — 80048 BASIC METABOLIC PNL TOTAL CA: CPT

## 2022-06-26 PROCEDURE — 6370000000 HC RX 637 (ALT 250 FOR IP): Performed by: ORTHOPAEDIC SURGERY

## 2022-06-26 PROCEDURE — 6370000000 HC RX 637 (ALT 250 FOR IP): Performed by: INTERNAL MEDICINE

## 2022-06-26 PROCEDURE — 1200000000 HC SEMI PRIVATE

## 2022-06-26 PROCEDURE — 85025 COMPLETE CBC W/AUTO DIFF WBC: CPT

## 2022-06-26 PROCEDURE — 99232 SBSQ HOSP IP/OBS MODERATE 35: CPT | Performed by: INTERNAL MEDICINE

## 2022-06-26 PROCEDURE — 97116 GAIT TRAINING THERAPY: CPT

## 2022-06-26 PROCEDURE — 97530 THERAPEUTIC ACTIVITIES: CPT

## 2022-06-26 PROCEDURE — 36415 COLL VENOUS BLD VENIPUNCTURE: CPT

## 2022-06-26 RX ADMIN — PANTOPRAZOLE SODIUM 40 MG: 40 TABLET, DELAYED RELEASE ORAL at 06:15

## 2022-06-26 RX ADMIN — HYDROCODONE BITARTRATE AND ACETAMINOPHEN 2 TABLET: 5; 325 TABLET ORAL at 23:33

## 2022-06-26 RX ADMIN — MAGNESIUM OXIDE TAB 400 MG (241.3 MG ELEMENTAL MG) 400 MG: 400 (241.3 MG) TAB at 09:29

## 2022-06-26 RX ADMIN — FENOFIBRATE 160 MG: 160 TABLET ORAL at 09:30

## 2022-06-26 RX ADMIN — METOPROLOL SUCCINATE 50 MG: 50 TABLET, EXTENDED RELEASE ORAL at 09:29

## 2022-06-26 RX ADMIN — POLYETHYLENE GLYCOL 3350 17 G: 17 POWDER, FOR SOLUTION ORAL at 09:29

## 2022-06-26 RX ADMIN — ATORVASTATIN CALCIUM 10 MG: 10 TABLET, FILM COATED ORAL at 09:30

## 2022-06-26 RX ADMIN — HYDROCODONE BITARTRATE AND ACETAMINOPHEN 2 TABLET: 5; 325 TABLET ORAL at 15:50

## 2022-06-26 RX ADMIN — DOCUSATE SODIUM 100 MG: 100 CAPSULE, LIQUID FILLED ORAL at 23:33

## 2022-06-26 RX ADMIN — APIXABAN 5 MG: 5 TABLET, FILM COATED ORAL at 23:33

## 2022-06-26 RX ADMIN — THIAMINE HCL TAB 100 MG 100 MG: 100 TAB at 09:30

## 2022-06-26 RX ADMIN — FOLIC ACID 1 MG: 1 TABLET ORAL at 09:30

## 2022-06-26 RX ADMIN — APIXABAN 5 MG: 5 TABLET, FILM COATED ORAL at 10:28

## 2022-06-26 RX ADMIN — HYDROCODONE BITARTRATE AND ACETAMINOPHEN 2 TABLET: 5; 325 TABLET ORAL at 06:15

## 2022-06-26 RX ADMIN — DOCUSATE SODIUM 100 MG: 100 CAPSULE, LIQUID FILLED ORAL at 09:29

## 2022-06-26 RX ADMIN — CITALOPRAM HYDROBROMIDE 20 MG: 20 TABLET ORAL at 23:33

## 2022-06-26 ASSESSMENT — PAIN DESCRIPTION - ONSET
ONSET: ON-GOING
ONSET: ON-GOING

## 2022-06-26 ASSESSMENT — PAIN DESCRIPTION - LOCATION
LOCATION: HIP
LOCATION: HIP

## 2022-06-26 ASSESSMENT — PAIN DESCRIPTION - ORIENTATION
ORIENTATION: LEFT
ORIENTATION: LEFT

## 2022-06-26 ASSESSMENT — PAIN DESCRIPTION - DESCRIPTORS
DESCRIPTORS: SHARP;ACHING
DESCRIPTORS: SHARP;ACHING

## 2022-06-26 ASSESSMENT — PAIN DESCRIPTION - PAIN TYPE
TYPE: SURGICAL PAIN
TYPE: SURGICAL PAIN

## 2022-06-26 ASSESSMENT — PAIN SCALES - GENERAL
PAINLEVEL_OUTOF10: 7
PAINLEVEL_OUTOF10: 3

## 2022-06-26 ASSESSMENT — PAIN DESCRIPTION - FREQUENCY
FREQUENCY: INTERMITTENT
FREQUENCY: INTERMITTENT

## 2022-06-26 ASSESSMENT — PAIN - FUNCTIONAL ASSESSMENT
PAIN_FUNCTIONAL_ASSESSMENT: PREVENTS OR INTERFERES SOME ACTIVE ACTIVITIES AND ADLS
PAIN_FUNCTIONAL_ASSESSMENT: PREVENTS OR INTERFERES SOME ACTIVE ACTIVITIES AND ADLS

## 2022-06-26 NOTE — PLAN OF CARE
Problem: Discharge Planning  Goal: Discharge to home or other facility with appropriate resources  Outcome: Progressing  Flowsheets (Taken 6/26/2022 0941)  Discharge to home or other facility with appropriate resources:   Identify barriers to discharge with patient and caregiver   Arrange for needed discharge resources and transportation as appropriate   Identify discharge learning needs (meds, wound care, etc)   Refer to discharge planning if patient needs post-hospital services based on physician order or complex needs related to functional status, cognitive ability or social support system     Problem: Pain  Goal: Verbalizes/displays adequate comfort level or baseline comfort level  Outcome: Progressing  Flowsheets (Taken 6/26/2022 2014)  Verbalizes/displays adequate comfort level or baseline comfort level:   Encourage patient to monitor pain and request assistance   Assess pain using appropriate pain scale   Implement non-pharmacological measures as appropriate and evaluate response   Administer analgesics based on type and severity of pain and evaluate response   Consider cultural and social influences on pain and pain management   Notify Licensed Independent Practitioner if interventions unsuccessful or patient reports new pain  Note: Pain level assessment complete. Patient educated on pain scale and control interventions  PRN pain medication given per patient request-Norco  Patient instructed to call out with new onset of pain or unrelieved pain     Problem: Skin/Tissue Integrity  Goal: Absence of new skin breakdown  Description: 1. Monitor for areas of redness and/or skin breakdown  2.   Assess vascular access sites hourly  Outcome: Progressing     Problem: Safety - Adult  Goal: Free from fall injury  Outcome: Progressing  Flowsheets (Taken 6/26/2022 1517)  Free From Fall Injury: Instruct family/caregiver on patient safety     Problem: ABCDS Injury Assessment  Goal: Absence of physical injury  Outcome: Progressing  Flowsheets (Taken 6/26/2022 1517)  Absence of Physical Injury: Implement safety measures based on patient assessment     Problem: Respiratory - Adult  Goal: Achieves optimal ventilation and oxygenation  Outcome: Progressing     Problem: Skin/Tissue Integrity - Adult  Goal: Skin integrity remains intact  Outcome: Progressing  Flowsheets  Taken 6/26/2022 1518  Skin Integrity Remains Intact:   Monitor for areas of redness and/or skin breakdown   Assess vascular access sites hourly  Taken 6/26/2022 0941  Skin Integrity Remains Intact:   Monitor for areas of redness and/or skin breakdown   Assess vascular access sites hourly  Goal: Incisions, wounds, or drain sites healing without S/S of infection  Outcome: Progressing  Flowsheets (Taken 6/26/2022 0941)  Incisions, Wounds, or Drain Sites Healing Without Sign and Symptoms of Infection:   TWICE DAILY: Assess and document skin integrity   TWICE DAILY: Assess and document dressing/incision, wound bed, drain sites and surrounding tissue  Goal: Oral mucous membranes remain intact  Outcome: Progressing  Flowsheets (Taken 6/26/2022 0941)  Oral Mucous Membranes Remain Intact:   Assess oral mucosa and hygiene practices   Implement preventative oral hygiene regimen     Problem: Musculoskeletal - Adult  Goal: Return mobility to safest level of function  Outcome: Progressing  Flowsheets (Taken 6/26/2022 0941)  Return Mobility to Safest Level of Function:   Assess patient stability and activity tolerance for standing, transferring and ambulating with or without assistive devices   Assist with transfers and ambulation using safe patient handling equipment as needed   Ensure adequate protection for wounds/incisions during mobilization   Obtain physical therapy/occupational therapy consults as needed   Instruct patient/family in ordered activity level  Goal: Maintain proper alignment of affected body part  Outcome: Progressing  Flowsheets (Taken 6/26/2022 0941)  Maintain proper alignment of affected body part:   Support and protect limb and body alignment per provider's orders   Instruct and reinforce with patient and family use of appropriate assistive device and precautions (e.g. spinal or hip dislocation precautions)  Goal: Return ADL status to a safe level of function  Outcome: Progressing  Flowsheets (Taken 6/26/2022 3247)  Return ADL Status to a Safe Level of Function:   Administer medication as ordered   Assess activities of daily living deficits and provide assistive devices as needed   Obtain physical therapy/occupational therapy consults as needed   Assist and instruct patient to increase activity and self care as tolerated

## 2022-06-26 NOTE — PLAN OF CARE
[facility]  Office: [address], DO, Cata Jump, DO, Adri Dry, DO, Kiana Contreras Blood, DO, Justa Díaz MD, Mikel Bingham MD, Willy Braxton MD, Alexandria Angeles MD, Erendira Hernandez MD, Jeronimo Cotto MD, Rosario Simon MD, Estelle Garcia, DO, Daniela Hernandez MD,  Bruna Franco, DO, John Felix MD, Sina Uribe MD, Rianna Du, DO, Emigdio Boyd MD, Satinder Delaney MD, Claudell Orion, DO, Kiet Espinoza MD, Sally Flores MD, Mackenzie Mills [facility], [facility], CNP, Tim Cheney, CNP, Joey Londono, CNP, Sarah Beth Albarado, CNP, Elmer Lawson, CNP, TIFFANY RonC, Laurence Cannon, DNP, Celeste Barnes, CNP, Berlinda Boast, CNP, Phoebe Armas, CNP, Milton Gamino, CNS, Maggie Collado, DNP, Tomi Cooper, CNP, Norma Wood, CNP, [facility]    Second Visit Note  For more detailed information please refer to the progress note of the day      6/26/2022    5:23 PM    Name:   Stanley Chery  MRN:     [id number]     Acct:      [de-identified]   Room:   2024/2024-01   Day:  4  Admit Date:  6/22/2022 10:28 PM    PCP:   No primary care provider on file. Code Status:  DNR-CCA      Pt vitals were reviewed   New labs were reviewed   Patient was seen    Updated plan :     1. Discussed CODE STATUS, patient has prior living will and would not want heroic measures. CODE STATUS changed to DNR CC arrest without intubation per her request and prior wishes.         Elizabeth Meek DO  6/26/2022  5:23 PM

## 2022-06-26 NOTE — PROGRESS NOTES
[facility]  Office: [address], DO, Fide Todd, DO, Kay Mail, DO, Margoth Marie, DO, Driss Alcantara MD, Norma Bansal MD, Herson Sierra MD, Linh Yarbrough MD, Candelario Pollack MD, Aleida Chery MD, Radha Houser MD, Alex Green, DO, Melia Licona MD,  Laverne De Oliveira DO, Rhonda Mcdowell MD, John Vasquez MD, Lola Hernandez, DO, Adry Torres MD, Madelyn Michelle MD, Kirill Gao, DO, Willy Huerta MD, Donya Lomeli MD, Velia Teresa, [facility], [facility], CNP, Jen Doan, CNP, Jeison Dickson, CNP, Antonia Hyman, CNP, Damaris Sanchez, CNP, KRISTINA Mancera-C, Fransisca Mcgovern, DNP, Ricky Navarro, CNP, Shira Vaca, CNP, Jovanny Loredo, CNP, Sohan Bañuelos, CNS, Jacky Casas, [facility], Ivan Navarro, CNP, Juan Zhu, [facility], Soraya Joel, [facility]    Progress Note    6/26/2022    7:46 AM    Name:   Milan Ferguson  MRN:     [id number]     Acct:      [de-identified]   Room:   2024/2024-01  IP Day:  4  Admit Date:  6/22/2022 10:28 PM    PCP:   No primary care provider on file. Code Status:  Full Code    Subjective:     C/C:   Chief Complaint   Patient presents with    Fall    Hip Pain     Interval History Status: improved. Patient with postoperative pain is relatively well controlled. Resting, denies any complaints of chest pain, shortness of breath, nausea vomiting, fevers or chills or acute complaints. Brief History:      This is a 75-year-old female that ambulates with a walker at home and had an accidental fall when she tripped over her walker.  She had immediate onset of hip pain and was brought to the emergency room. Marylen Harden was found to have a closed left hip fracture and is admitted for orthopedic evaluation management.     Patient underwent ORIF of hip on the 24th    Review of Systems:     Constitutional:  negative for chills, fevers, sweats  Respiratory:  negative for cough, dyspnea on exertion, shortness of breath, wheezing  Cardiovascular:  negative for chest pain, chest pressure/discomfort, lower extremity edema, palpitations  Gastrointestinal:  negative for abdominal pain, constipation, diarrhea, nausea, vomiting  Neurological:  negative for dizziness, headache    Medications: Allergies: Allergies   Allergen Reactions    Sulfamethoxazole-Trimethoprim      Other reaction(s): GI Disturbance  This agent causes hyperkalemia and acute kidney injury in this patient       Current Meds:   Scheduled Meds:    citalopram  20 mg Oral Nightly    [START ON 7/6/2022] cyanocobalamin  1,000 mcg IntraMUSCular Q30 Days    apixaban  5 mg Oral BID    atorvastatin  10 mg Oral Daily    fenofibrate  160 mg Oral Daily    magnesium oxide  400 mg Oral Daily    metoprolol succinate  50 mg Oral Daily    pantoprazole  40 mg Oral QAM AC    polyethylene glycol  17 g Oral Daily    docusate sodium  100 mg Oral BID    thiamine  100 mg Oral Daily    folic acid  1 mg Oral Daily     Continuous Infusions:   PRN Meds: HYDROcodone 5 mg - acetaminophen, HYDROcodone 5 mg - acetaminophen, albuterol sulfate HFA, docusate sodium, morphine, potassium chloride **OR** potassium alternative oral replacement **OR** potassium chloride, magnesium sulfate, ondansetron **OR** ondansetron, acetaminophen **OR** acetaminophen    Data:     Past Medical History:   has a past medical history of Atrial fibrillation (HCC) and HTN (hypertension). Social History:   reports that she quit smoking about 39 years ago. She has never used smokeless tobacco. She reports current alcohol use. She reports that she does not use drugs.      Family History:   Family History   Problem Relation Age of Onset    Heart Disease Mother     Cancer Father     Hypertension Sister     Stroke Sister        Vitals:  BP (!) 101/38   Pulse 69   Temp 97.7 °F (36.5 °C) (Oral)   Resp 16   Ht 5' 6\" (1.676 m)   Wt 160 lb (72.6 kg)   SpO2 96%   BMI 25.82 kg/m²   Temp (24hrs), Av.7 °F (36.5 °C), Min:97.2 °F (36.2 °C), Max:97.9 °F (36.6 °C)    No results for input(s): POCGLU in the last 72 hours. I/O (24Hr): Intake/Output Summary (Last 24 hours) at 2022 0746  Last data filed at 2022 0736  Gross per 24 hour   Intake 2580.23 ml   Output 1600 ml   Net 980.23 ml       Labs:  Hematology:  Recent Labs     22  0622  0938 22  0548   WBC 5.4 6.2 5.3   RBC 2.97* 2.47* 2.29*   HGB 9.3* 7.8* 7.2*   HCT 28.6* 24.7* 22.7*   MCV 96.3 100.0 99.1   MCH 31.3 31.6 31.4   MCHC 32.5 31.6 31.7   RDW 13.2 13.2 13.2    181 189   MPV 8.2 8.5 8.6     Chemistry:  Recent Labs     22  0622  0938 22  0548   * 132* 137   K 4.4 4.0 3.9    100 103   CO2 21 21 26   GLUCOSE 115* 232* 110*   BUN 11 10 10   CREATININE 0.74 0.86 0.73   ANIONGAP 9 11 8*   LABGLOM >60 >60 >60   GFRAA >60 >60 >60   CALCIUM 8.8 9.0 9.3   No results for input(s): PROT, LABALBU, LABA1C, Q7EXMSI, W0UTEZM, FT4, TSH, AST, ALT, LDH, GGT, ALKPHOS, LABGGT, BILITOT, BILIDIR, AMMONIA, AMYLASE, LIPASE, LACTATE, CHOL, HDL, LDLCHOLESTEROL, CHOLHDLRATIO, TRIG, VLDL, NHF70VL, PHENYTOIN, PHENYF, URICACID, POCGLU in the last 72 hours. ABG:No results found for: POCPH, PHART, PH, POCPCO2, UMR0WKP, PCO2, POCPO2, PO2ART, PO2, POCHCO3, JQS0KRL, HCO3, NBEA, PBEA, BEART, BE, THGBART, THB, EMU4YGU, RFTC6VNN, T4UVPSOX, O2SAT, FIO2  No results found for: SPECIAL  No results found for: CULTURE    Radiology:  XR HIP LEFT (2-3 VIEWS)    Result Date: 2022  Status post left hip internal fixation of a hip fracture     XR FEMUR LEFT (MIN 2 VIEWS)    Result Date: 2022  1. Redemonstration of impacted, angulated intertrochanteric femur fracture. 2.  Findings compatible with old trauma in the distal femoral metaphysis and prior surgery in the proximal tibia.      XR CHEST 1 VIEW    Result Date: 2022  Chronic findings in the chest without acute airspace disease identified. XR HIP 2-3 VW W PELVIS LEFT    Result Date: 6/22/2022  Impacted and angulated intertrochanteric left femur fracture. Physical Examination:        General appearance:  alert, cooperative and no distress  Mental Status:  oriented to person, place and time and normal affect  Lungs:  clear to auscultation bilaterally, normal effort  Heart:  regular rate and rhythm, no murmur  Abdomen:  soft, nontender, nondistended, normal bowel sounds, no masses, hepatomegaly, splenomegaly  Extremities:  no edema, redness, tenderness in the calves  Skin:  no gross lesions, rashes, induration    Assessment:        Hospital Problems           Last Modified POA    * (Principal) Closed left hip fracture, initial encounter ([facility] [address] 6/23/2022 Yes    Alcoholism ([facility] [address] 6/23/2022 Yes    Overview Signed 6/23/2022  7:44 AM by WISAM Putnam NP     Last Assessment & Plan:   Formatting of this note might be different from the original.  Multivitamins   IV fluids         COPD without exacerbation ([facility] [address] 6/23/2022 Yes    Essential hypertension 6/23/2022 Yes    Overview Signed 6/23/2022  7:44 AM by WISAM Putnam NP     Last Assessment & Plan:   Formatting of this note is different from the original.  Blood pressures are mostly well controlled. Continue current medication regimen. Will monitor. BP Readings from Last 3 Encounters:   05/27/18 (!) 113/58   05/04/18 (!) 162/69   03/18/17 (!) 148/70         Mixed hyperlipidemia 6/23/2022 Yes    Overview Signed 6/23/2022  7:44 AM by WISAM Putnam NP     Last Assessment & Plan:   Formatting of this note might be different from the original.  Continue home meds         First degree AV block 6/23/2022 Yes    Chronic anticoagulation 6/23/2022 Yes    Pre-op evaluation 6/23/2022 Yes          Plan:        1. Continue PT and OT  2. GI and DVT prophylaxis   3. continue present analgesics  4. Oxygen and aerosols as  5. Continue home Eliquis  6.  Trend labs, transfuse as needed  7.  Discharge planning pending progress    Shereen Huizar DO  6/26/2022  7:46 AM

## 2022-06-26 NOTE — PROGRESS NOTES
Postop Note    Patient:  Modesta Altamirano  YOB: 1945     76 y.o. female      Subjective:  Patient seen and examined in the hospital. Pain controlled.        Objective:   Vitals:    06/26/22 0928   BP: (!) 115/43   Pulse: 70   Resp:    Temp:    SpO2:      Gen: NAD, awake  MSK:  Dressing/splint in place, clean/dry/intact  Grossly NVI  -compartments soft/compressible and Able to plantarflex/dorsiflex ankle appropriately, with intact sensation throughout      Impression/plan:   76 y.o. female 2 Days Post-Op, doing well overall    -ok to reinforce/change dressing PRN  -WB'ing status: WBAT  -Pain control:    -signed pain medicine prescription in chart  -DVT ppx:  Early mobilization  -continue previous/current regimen  -Dispo:   -per PT recommendations, but ok to discharge from an Ortho perspective  -Ortho to sign off, please page with any questions

## 2022-06-26 NOTE — PROGRESS NOTES
Physical Therapy  Facility/Department: [facility] MED SURG  Rehabilitation Physical Therapy Treatment Note    NAME: Sharonda Singh  : 1945 (76 y.o.)  MRN: [id number]  CODE STATUS: DNR-CCA    Date of Service: 22    Pt currently functioning below baseline. Recommend daily inpatient skilled therapy at time of discharge to maximize long term outcomes and prevent re-admission. Please refer to AM-PAC score for current level of function. Restrictions:  Restrictions/Precautions: General Precautions; Fall Risk;Weight Bearing  Lower Extremity Weight Bearing Restrictions  Left Lower Extremity Weight Bearing: Weight Bearing As Tolerated  Position Activity Restriction  Other position/activity restrictions: FWBAT LLE, ABD pillow, Heels off bed at all times, fall & seizure precautions, ext catheter, LUE IV, pt has leg length discrepancy of LLE, ALARMS     SUBJECTIVE  Subjective  Subjective: Patient up in bed upon therapist arrival this date. Patient agreeable to PT treatment and states that she now has her shoes. OTTO Crowe states patient is appropriate for PT treatment this date. OBJECTIVE  Cognition  Arousal/Alertness: Appropriate responses to stimuli  Orientation  Overall Orientation Status: Within Functional Limits    Functional Mobility  Bed Mobility  Overall Assistance Level: Moderate Assistance  Additional Factors: Set-up; Verbal cues; Increased time to complete; Head of bed raised; With handrails  Roll Left  Assistance Level: Moderate assistance  Roll Right  Assistance Level: Moderate assistance  Sit to Supine  Assistance Level: Moderate assistance  Supine to Sit  Assistance Level: Moderate assistance  Scooting  Assistance Level: Moderate assistance  Skilled Clinical Factors: Patient a MOD x1 for LEIGHANN LE to EOB and to scoot all the way out and place feet flat on the floor for increased stability and support. Balance  Sitting Balance: Contact guard assistance  Standing Balance:  Moderate assistance  Transfers  Surface: To bed;From bed  Additional Factors: Set-up; Verbal cues; Hand placement cues; Increased time to complete  Device: Walker  Sit to Stand  Assistance Level: Moderate assistance  Stand to Sit  Assistance Level: Minimal assistance      Environmental Mobility  Ambulation  Surface: Level surface  Device: Rolling walker  Distance: 3 feet to head of bed  Activity: Within Room  Activity Comments: Patient with good side stepping x4 to [address] this date. Patient requires MOD vc's for positioning hand placement and progression techniques this date. Additional Factors: Set-up; Verbal cues; Hand placement cues; Increased time to complete  Assistance Level: Moderate assistance  Gait Deviations: Slow dami      Neuromuscular Education  NDT Treatment: Gait ;Lower extremity; Sitting;Standing      PT Exercises  Circulation/Endurance Exercises: ankle pumps x 20      ASSESSMENT/PROGRESS TOWARDS GOALS     Assessment  Assessment: Patient s/p closed L Hip [place] requires a MOD assist to EOB seated posture and MOD assist with RW for ambulation with MOD vc's for hand placement and progression techniques throughout treatement. Patient would benefit from continued skilled PT treatment to maximize functional mobility  Activity Tolerance: Patient limited by fatigue;Patient limited by pain; Patient limited by endurance  Discharge Recommendations: Patient would benefit from continued therapy after discharge    Goals  Short Term Goals  Time Frame for Short term goals: 12 visits  Short term goal 1: Inc bed-mobility & transfers to independent to enable pt to safely get in/OOB & chair to return to PLOF & decrease risk for falls; Short term goal 2: Inc gait to amb 100ft or > indep w/ RW to enable pt to return to previous level of independence & able to demonstrate indep/ safe use of RW in functional activities including approaching surfaces and turning to sit  Short term goal 3:  Inc ROM L hip to [facility] & strength LLE to [facility] & standing balance to good with device to facilitate pt independence for performance of ADL's & functional mobility, & reduce fall risk  Short term goal 4: Pt able to tolerate 30-40 min of activity to include 15-20 reps of ex, NMR & functional mobility with device to facilitate activity tolerance to [facility]  Short term goal 5: Ed pt on home ex's, safety, balance & endurance training, pressure relief with Pt able to demonstrate effective pressure relief techniques, fall prevention, & issue written Pt Ed    PLAN OF CARE/SAFETY  Plan  Current Treatment Recommendations: Strengthening;ROM;Balance training;Transfer training;ADL/Self-care training; Endurance training;Gait training;Home exercise program;Safety education & training;Patient/Caregiver education & training  Plan Comment: 1-2x/D, 6-7D/week  Safety Devices  Type of Devices: Bed alarm in place;Call light within reach;Gait belt;Left in bed;Nurse notified    EDUCATION  Education  Education Given To: Patient  Education Provided: Mobility Training;Transfer Training; Safety; Fall Prevention Strategies  Education Method: Verbal;Teach Back  Barriers to Learning: Cognition  Education Outcome: Verbalized understanding;Continued education needed        Therapy Time   Individual Concurrent Group Co-treatment   Time In 3008         Time Out 1803         Minutes [address] [place] [address], 06/26/22 at 6:11 PM

## 2022-06-26 NOTE — PLAN OF CARE
Problem: Discharge Planning  Goal: Discharge to home or other facility with appropriate resources  6/26/2022 0243 by Simon Mclean RN  Outcome: Progressing  Flowsheets (Taken 6/25/2022 2204)  Discharge to home or other facility with appropriate resources:   Identify barriers to discharge with patient and caregiver   Arrange for needed discharge resources and transportation as appropriate   Identify discharge learning needs (meds, wound care, etc)     Problem: Pain  Goal: Verbalizes/displays adequate comfort level or baseline comfort level  6/26/2022 0243 by Simon Mclean RN  Outcome: Progressing     Problem: Skin/Tissue Integrity  Goal: Absence of new skin breakdown  Description: 1. Monitor for areas of redness and/or skin breakdown  2. Assess vascular access sites hourly  3. Every 4-6 hours minimum:  Change oxygen saturation probe site  4. Every 4-6 hours:  If on nasal continuous positive airway pressure, respiratory therapy assess nares and determine need for appliance change or resting period.   6/26/2022 0243 by Simon Mclean RN  Outcome: Progressing     Problem: Safety - Adult  Goal: Free from fall injury  6/26/2022 0243 by Simon Mclean RN  Outcome: Progressing  Flowsheets (Taken 6/26/2022 0239)  Free From Fall Injury: Instruct family/caregiver on patient safety     Problem: ABCDS Injury Assessment  Goal: Absence of physical injury  6/26/2022 0243 by Simon Mclean RN  Outcome: Progressing  Flowsheets (Taken 6/26/2022 0239)  Absence of Physical Injury: Implement safety measures based on patient assessment     Problem: Respiratory - Adult  Goal: Achieves optimal ventilation and oxygenation  6/26/2022 0243 by Simon Mclean RN  Outcome: Progressing     Problem: Skin/Tissue Integrity - Adult  Goal: Skin integrity remains intact  6/26/2022 0243 by Simon Mclean RN  Outcome: Progressing  Flowsheets (Taken 6/25/2022 2204)  Skin Integrity Remains Intact: Monitor for areas of redness and/or skin breakdown     Problem: Skin/Tissue Integrity - Adult  Goal: Incisions, wounds, or drain sites healing without S/S of infection  6/26/2022 0243 by Nilton Gibbons RN  Outcome: Progressing     Problem: Skin/Tissue Integrity - Adult  Goal: Oral mucous membranes remain intact  6/26/2022 0243 by Nilton Gibbons RN  Outcome: Progressing     Problem: Musculoskeletal - Adult  Goal: Return mobility to safest level of function  6/26/2022 0243 by Nilton Gibbons RN  Outcome: Progressing     Problem: Musculoskeletal - Adult  Goal: Maintain proper alignment of affected body part  6/26/2022 0243 by Nilton Gibbons RN  Outcome: Progressing     Problem: Musculoskeletal - Adult  Goal: Return ADL status to a safe level of function  6/26/2022 0243 by Nilton Gibbons RN  Outcome: Progressing

## 2022-06-27 VITALS
RESPIRATION RATE: 18 BRPM | OXYGEN SATURATION: 98 % | BODY MASS INDEX: 27 KG/M2 | HEIGHT: 66 IN | TEMPERATURE: 98 F | HEART RATE: 67 BPM | DIASTOLIC BLOOD PRESSURE: 58 MMHG | SYSTOLIC BLOOD PRESSURE: 136 MMHG | WEIGHT: 168 LBS

## 2022-06-27 LAB
ABSOLUTE EOS #: 0.25 K/UL (ref 0–0.4)
ABSOLUTE IMMATURE GRANULOCYTE: 0 K/UL (ref 0–0.3)
ABSOLUTE LYMPH #: 1.81 K/UL (ref 1–4.8)
ABSOLUTE MONO #: 0.49 K/UL (ref 0.2–0.8)
BASOPHILS # BLD: 0 %
BASOPHILS ABSOLUTE: 0 K/UL (ref 0–0.2)
EOSINOPHILS RELATIVE PERCENT: 5 % (ref 1–4)
HCT VFR BLD CALC: 19.5 % (ref 36.3–47.1)
HCT VFR BLD CALC: 27.3 % (ref 36.3–47.1)
HEMOGLOBIN: 6.3 G/DL (ref 11.9–15.1)
HEMOGLOBIN: 8.9 G/DL (ref 11.9–15.1)
IMMATURE GRANULOCYTES: 0 %
LYMPHOCYTES # BLD: 37 % (ref 24–44)
MCH RBC QN AUTO: 31.8 PG (ref 25.2–33.5)
MCHC RBC AUTO-ENTMCNC: 32.3 G/DL (ref 28.4–34.8)
MCV RBC AUTO: 98.5 FL (ref 82.6–102.9)
MONOCYTES # BLD: 10 % (ref 1–7)
NRBC AUTOMATED: 0 PER 100 WBC
PDW BLD-RTO: 13.3 % (ref 11.8–14.4)
PLATELET # BLD: 197 K/UL (ref 138–453)
PMV BLD AUTO: 8.5 FL (ref 8.1–13.5)
RBC # BLD: 1.98 M/UL (ref 3.95–5.11)
SEG NEUTROPHILS: 48 % (ref 36–66)
SEGMENTED NEUTROPHILS ABSOLUTE COUNT: 2.35 K/UL (ref 1.8–7.7)
WBC # BLD: 4.9 K/UL (ref 3.5–11.3)

## 2022-06-27 PROCEDURE — 86901 BLOOD TYPING SEROLOGIC RH(D): CPT

## 2022-06-27 PROCEDURE — 86900 BLOOD TYPING SEROLOGIC ABO: CPT

## 2022-06-27 PROCEDURE — 86920 COMPATIBILITY TEST SPIN: CPT

## 2022-06-27 PROCEDURE — 85018 HEMOGLOBIN: CPT

## 2022-06-27 PROCEDURE — 6370000000 HC RX 637 (ALT 250 FOR IP): Performed by: ORTHOPAEDIC SURGERY

## 2022-06-27 PROCEDURE — 36430 TRANSFUSION BLD/BLD COMPNT: CPT

## 2022-06-27 PROCEDURE — 85014 HEMATOCRIT: CPT

## 2022-06-27 PROCEDURE — 85025 COMPLETE CBC W/AUTO DIFF WBC: CPT

## 2022-06-27 PROCEDURE — P9016 RBC LEUKOCYTES REDUCED: HCPCS

## 2022-06-27 PROCEDURE — 86850 RBC ANTIBODY SCREEN: CPT

## 2022-06-27 PROCEDURE — 6370000000 HC RX 637 (ALT 250 FOR IP): Performed by: INTERNAL MEDICINE

## 2022-06-27 PROCEDURE — 36415 COLL VENOUS BLD VENIPUNCTURE: CPT

## 2022-06-27 PROCEDURE — 99232 SBSQ HOSP IP/OBS MODERATE 35: CPT | Performed by: INTERNAL MEDICINE

## 2022-06-27 RX ORDER — DOCUSATE SODIUM 100 MG/1
100 CAPSULE, LIQUID FILLED ORAL 2 TIMES DAILY
DISCHARGE
Start: 2022-06-27

## 2022-06-27 RX ORDER — SODIUM CHLORIDE 9 MG/ML
INJECTION, SOLUTION INTRAVENOUS PRN
Status: DISCONTINUED | OUTPATIENT
Start: 2022-06-27 | End: 2022-06-27 | Stop reason: HOSPADM

## 2022-06-27 RX ADMIN — POLYETHYLENE GLYCOL 3350 17 G: 17 POWDER, FOR SOLUTION ORAL at 09:52

## 2022-06-27 RX ADMIN — MAGNESIUM OXIDE TAB 400 MG (241.3 MG ELEMENTAL MG) 400 MG: 400 (241.3 MG) TAB at 09:54

## 2022-06-27 RX ADMIN — FENOFIBRATE 160 MG: 160 TABLET ORAL at 09:54

## 2022-06-27 RX ADMIN — THIAMINE HCL TAB 100 MG 100 MG: 100 TAB at 09:54

## 2022-06-27 RX ADMIN — METOPROLOL SUCCINATE 50 MG: 50 TABLET, EXTENDED RELEASE ORAL at 09:59

## 2022-06-27 RX ADMIN — HYDROCODONE BITARTRATE AND ACETAMINOPHEN 2 TABLET: 5; 325 TABLET ORAL at 09:53

## 2022-06-27 RX ADMIN — ATORVASTATIN CALCIUM 10 MG: 10 TABLET, FILM COATED ORAL at 09:54

## 2022-06-27 RX ADMIN — PANTOPRAZOLE SODIUM 40 MG: 40 TABLET, DELAYED RELEASE ORAL at 06:31

## 2022-06-27 RX ADMIN — FOLIC ACID 1 MG: 1 TABLET ORAL at 09:54

## 2022-06-27 RX ADMIN — DOCUSATE SODIUM 100 MG: 100 CAPSULE, LIQUID FILLED ORAL at 09:54

## 2022-06-27 RX ADMIN — HYDROCODONE BITARTRATE AND ACETAMINOPHEN 2 TABLET: 5; 325 TABLET ORAL at 17:39

## 2022-06-27 ASSESSMENT — PAIN DESCRIPTION - LOCATION
LOCATION: HIP
LOCATION: HIP

## 2022-06-27 ASSESSMENT — PAIN DESCRIPTION - DESCRIPTORS
DESCRIPTORS: ACHING
DESCRIPTORS: ACHING

## 2022-06-27 ASSESSMENT — PAIN - FUNCTIONAL ASSESSMENT
PAIN_FUNCTIONAL_ASSESSMENT: PREVENTS OR INTERFERES WITH ALL ACTIVE AND SOME PASSIVE ACTIVITIES
PAIN_FUNCTIONAL_ASSESSMENT: PREVENTS OR INTERFERES WITH MANY ACTIVE NOT PASSIVE ACTIVITIES

## 2022-06-27 ASSESSMENT — PAIN DESCRIPTION - FREQUENCY: FREQUENCY: INTERMITTENT

## 2022-06-27 ASSESSMENT — PAIN DESCRIPTION - ONSET: ONSET: ON-GOING

## 2022-06-27 ASSESSMENT — PAIN SCALES - GENERAL
PAINLEVEL_OUTOF10: 7
PAINLEVEL_OUTOF10: 0
PAINLEVEL_OUTOF10: 4
PAINLEVEL_OUTOF10: 7

## 2022-06-27 ASSESSMENT — PAIN DESCRIPTION - ORIENTATION
ORIENTATION: LEFT
ORIENTATION: LEFT

## 2022-06-27 ASSESSMENT — PAIN DESCRIPTION - PAIN TYPE: TYPE: SURGICAL PAIN

## 2022-06-27 NOTE — DISCHARGE INSTR - COC
Continuity of Care Form    Patient Name: Adele Martinez   :  1945  MRN:  [id number]    Admit date:  2022  Discharge date: 22    Code Status Order: DNR-CCA   Advance Directives:      Admitting Physician:  Idalia Dalton DO  PCP: No primary care provider on file. Discharging Nurse: [facility] Unit/Room#:   Discharging Unit Phone Number: [phone]    Emergency Contact:   Extended Emergency Contact Information  Primary Emergency Contact: Mirela Walton [address]7 Phone: [phone]  Relation: Child    Past Surgical History:  Past Surgical History:   Procedure Laterality Date    APPENDECTOMY      HIP SURGERY Left 2022    LEFT HIP  [address] NAIL  - SYNTHES performed by Osmani Tellez MD at [facility]       Immunization History:   Immunization History   Administered Date(s) Administered    COVID-19, [place], Primary or Immunocompromised, PF, 100mcg/0.5mL 2021, 2021, 2021, 2022    Influenza Virus Vaccine 2005, 2011, 11/10/2011, 2013    Influenza, High Dose (Fluzone 65 yrs and older) 12/10/2015, 2017, 2018, 2019    Influenza, Felipe Golas, IM, PF (6 mo and older Fluzone, Flulaval, Fluarix, and 3 yrs and older Afluria) 10/13/2020    Influenza, Quadv, adjuvanted, 65 yrs +, IM, PF (Fluad) 2021    Pneumococcal Polysaccharide ([id number]) 10/13/2020    Zoster Live (Zostavax) 2009       Active Problems:  Patient Active Problem List   Diagnosis Code    Closed left hip fracture, initial encounter ([facility] [address] S72.002A    Alcoholism ([address] F10.20    Persistent atrial fibrillation ([address] I48.19    B12 deficiency anemia D51.9    Cerebrovascular disease I67.9    Closed fracture of transverse process of lumbar vertebra ([address] S32.009A    COPD without exacerbation ([address] J44.9    Depression F32. A    Diverticulosis of large intestine K57.30    Essential hypertension I10    Femur fracture, left (HCC) S72. 92XA    Mixed hyperlipidemia E78.2 per 24 hour   Intake 300 ml   Output 750 ml   Net -450 ml     I/O last 3 completed shifts: In: 2880.2 [P.O.:800; I.V.:2030.2; IV Piggyback:50]  Out: 1700 [Urine:1700]    Safety Concerns:     History of Falls (last 30 days)    Impairments/Disabilities:      Vision and Hearing    Nutrition Therapy:  Current Nutrition Therapy:   - Oral Diet:  General    Routes of Feeding: Oral  Liquids: No Restrictions  Daily Fluid Restriction: no  Last Modified Barium Swallow with Video (Video Swallowing Test): not done    Treatments at the Time of Hospital Discharge:   Respiratory Treatments:   Oxygen Therapy:  is not on home oxygen therapy. Ventilator:    - No ventilator support    Rehab Therapies: Physical Therapy and Occupational Therapy  Weight Bearing Status/Restrictions: No weight bearing restrictions  Other Medical Equipment (for information only, NOT a DME order):  walker  Other Treatments: dry dressing to left hip x2 sites daily     Patient's personal belongings (please select all that are sent with patient):  None    RN SIGNATURE:  Electronically signed by José Miguel Hammond RN on 6/27/22 at 11:43 AM EDT    CASE MANAGEMENT/SOCIAL WORK SECTION    Inpatient Status Date: ***    Readmission Risk Assessment Score:  Readmission Risk              Risk of Unplanned Readmission:  16           Discharging to Facility/ Agency   Name: Mikhail Mujica are discharging to [facility]    Name:       [facility]  Address:    [address] [place] [address], [address] [place] [address]  Phone:  [phone]  Fax:  [phone]   Address:  Phone:  Fax:    Dialysis Facility (if applicable)   Name:  Address:  Dialysis Schedule:  Phone:  Fax:    / signature: Electronically signed by SU Gamble on 6/27/22 at 5:36 PM EDT    PHYSICIAN SECTION    Prognosis: Fair    Condition at Discharge: Stable    Rehab Potential (if transferring to Rehab):  Fair    Recommended Labs or Other Treatments After Discharge: PT and OT evaluate and treat. CBC and BMP in 1 week    Physician Certification: I certify the above information and transfer of Jana Whyte  is necessary for the continuing treatment of the diagnosis listed and that she requires [facility] for less 30 days.      Update Admission H&P: No change in H&P    PHYSICIAN SIGNATURE:  Electronically signed by Avis Rodas DO on 6/27/22 at 4:24 PM EDT

## 2022-06-27 NOTE — CARE COORDINATION
Social work: authorization obtained for [facility]. Patient to dc to [facility] via [facility] at 6:45PM.  # for RN report: [phone]. Completed ROMI faxed to [phone]. Informed RN, pt, and facility of dc time, agreeable to plan. HENS completed.

## 2022-06-27 NOTE — PROGRESS NOTES
Occupational Therapy  DATE: 2022    NAME: Nathanael Muñiz  MRN: [id number]   : 1945    Patient not seen this date for Occupational Therapy due to:      [] Cancel by RN or physician due to:    [] Hemodialysis    [] Critical Lab Value Level     [x] Blood transfusion in progress: HGB 6.3    [] Acute or unstable cardiovascular status   _MAP < 55 or more than >115  _HR < 40 or > 130    [] Acute or unstable pulmonary status   -FiO2 > 60%   _RR < 5 or >40    _O2 sats < 85%    [] Strict Bedrest    [] Off Unit for surgery or procedure    [] Off Unit for testing       [] Pending imaging to R/O fracture    [] Refusal by Patient      [] Other      [] OT being discontinued at this time. Patient independent. No further needs. [] OT being discontinued at this time as the patient has been transferred to hospice care. No further needs.       MORAIMA Briggs

## 2022-06-27 NOTE — PROGRESS NOTES
[facility]  Office: [address], DO, Roma Conklin DO, Lola Salamanca, DO, Debbiemark Marie, DO, Shraddha Trejo MD, Bernardo Doll MD, Alejandro Head MD, Shi Zamora MD, Klaus Sullivan MD, Kristie Carter MD, Kevin Benavidez MD, Sylvia Lopez, DO, Bill Bowers MD,  Sina Mcnamara DO, Zachery Mcnair MD, Alessio Lobo MD, Renee Brantley DO, Cinthia Acosta MD, Cassi Coker MD, Lela Leblanc DO, Kaylene Marr MD, Albina Rodríguez MD, Megan Avelar CNP, [facility], CNP, Deanna Shah, CNP, Leona Joel, CNP, Serafin Espinoza, CNP, Annalise Mujica, CNP, TIFFANY RamirezC, Aleyda Agustin, DNP, Micheal Ceballos, CNP, Eduin Berg, CNP, Yari Fernando, CNP, Sarah Davis, CNS, Yoli Ken, [facility], Lucinda Phillips, CNP, Tri Espinoza, CNP, Amirah Keyes, [facility]    Progress Note    6/27/2022    8:45 AM    Name:   Kathe Kim  MRN:     [id number]     Acct:      [de-identified]   Room:   2024/2024-01   Day:  5  Admit Date:  6/22/2022 10:28 PM    PCP:   No primary care provider on file. Code Status:  DNR-CCA    Subjective:     C/C:   Chief Complaint   Patient presents with    Fall    Hip Pain     Interval History Status: improved. Patient's hip pain is reasonably controlled. Denies any chest pain, shortness of breath, nausea or vomiting, fevers or chills or acute complaints. Brief History:      This is a 49-year-old female that ambulates with a walker at home and had an accidental fall when she tripped over her walker.  She had immediate onset of hip pain and was brought to the emergency room. Ellyn Howard was found to have a closed left hip fracture and is admitted for orthopedic evaluation management.     Patient underwent ORIF of hip on the 24th    Review of Systems:     Constitutional:  negative for chills, fevers, sweats  Respiratory:  negative for cough, dyspnea on exertion, shortness of breath, wheezing  Cardiovascular:  negative for chest pain, chest pressure/discomfort, lower extremity edema, palpitations  Gastrointestinal:  negative for abdominal pain, constipation, diarrhea, nausea, vomiting  Neurological:  negative for dizziness, headache    Medications: Allergies: Allergies   Allergen Reactions    Sulfamethoxazole-Trimethoprim      Other reaction(s): GI Disturbance  This agent causes hyperkalemia and acute kidney injury in this patient       Current Meds:   Scheduled Meds:    citalopram  20 mg Oral Nightly    [START ON 7/6/2022] cyanocobalamin  1,000 mcg IntraMUSCular Q30 Days    apixaban  5 mg Oral BID    atorvastatin  10 mg Oral Daily    fenofibrate  160 mg Oral Daily    magnesium oxide  400 mg Oral Daily    metoprolol succinate  50 mg Oral Daily    pantoprazole  40 mg Oral QAM AC    polyethylene glycol  17 g Oral Daily    docusate sodium  100 mg Oral BID    thiamine  100 mg Oral Daily    folic acid  1 mg Oral Daily     Continuous Infusions:    sodium chloride       PRN Meds: sodium chloride, HYDROcodone 5 mg - acetaminophen, HYDROcodone 5 mg - acetaminophen, albuterol sulfate HFA, docusate sodium, morphine, potassium chloride **OR** potassium alternative oral replacement **OR** potassium chloride, magnesium sulfate, ondansetron **OR** ondansetron, acetaminophen **OR** acetaminophen    Data:     Past Medical History:   has a past medical history of Atrial fibrillation (HCC) and HTN (hypertension). Social History:   reports that she quit smoking about 39 years ago. She has never used smokeless tobacco. She reports current alcohol use. She reports that she does not use drugs.      Family History:   Family History   Problem Relation Age of Onset    Heart Disease Mother     Cancer Father     Hypertension Sister     Stroke Sister        Vitals:  BP (!) 144/50   Pulse 65   Temp 98.1 °F (36.7 °C) (Oral)   Resp 18   Ht 5' 6\" (1.676 m)   Wt 168 lb (76.2 kg)   SpO2 96%   BMI 27.12 kg/m²   Temp (24hrs), Av.2 °F (36.8 °C), Min:98.1 °F (36.7 °C), Max:98.3 °F (36.8 °C)    No results for input(s): POCGLU in the last 72 hours. I/O (24Hr): Intake/Output Summary (Last 24 hours) at 2022 0845  Last data filed at 2022 8496  Gross per 24 hour   Intake 300 ml   Output 750 ml   Net -450 ml       Labs:  Hematology:  Recent Labs     22  0938 22  0548 22  0602   WBC 6.2 5.3 4.9   RBC 2.47* 2.29* 1.98*   HGB 7.8* 7.2* 6.3*   HCT 24.7* 22.7* 19.5*   .0 99.1 98.5   MCH 31.6 31.4 31.8   MCHC 31.6 31.7 32.3   RDW 13.2 13.2 13.3    189 197   MPV 8.5 8.6 8.5     Chemistry:  Recent Labs     22  0938 22  0548   * 137   K 4.0 3.9    103   CO2 21 26   GLUCOSE 232* 110*   BUN 10 10   CREATININE 0.86 0.73   ANIONGAP 11 8*   LABGLOM >60 >60   GFRAA >60 >60   CALCIUM 9.0 9.3   No results for input(s): PROT, LABALBU, LABA1C, N8AOPFT, C5RZTDK, FT4, TSH, AST, ALT, LDH, GGT, ALKPHOS, LABGGT, BILITOT, BILIDIR, AMMONIA, AMYLASE, LIPASE, LACTATE, CHOL, HDL, LDLCHOLESTEROL, CHOLHDLRATIO, TRIG, VLDL, MKP93IR, PHENYTOIN, PHENYF, URICACID, POCGLU in the last 72 hours. ABG:No results found for: POCPH, PHART, PH, POCPCO2, TLM3SMR, PCO2, POCPO2, PO2ART, PO2, POCHCO3, DRI9YAR, HCO3, NBEA, PBEA, BEART, BE, THGBART, THB, PHI5NVZ, AKDT3HCL, U7GTYWIE, O2SAT, FIO2  No results found for: SPECIAL  No results found for: CULTURE    Radiology:  XR HIP LEFT (2-3 VIEWS)    Result Date: 2022  Status post left hip internal fixation of a hip fracture     XR FEMUR LEFT (MIN 2 VIEWS)    Result Date: 2022  1. Redemonstration of impacted, angulated intertrochanteric femur fracture. 2.  Findings compatible with old trauma in the distal femoral metaphysis and prior surgery in the proximal tibia. XR CHEST 1 VIEW    Result Date: 2022  Chronic findings in the chest without acute airspace disease identified.      XR HIP 2-3 VW W PELVIS LEFT    Result Date: 6/22/2022  Impacted and angulated intertrochanteric left femur fracture. Physical Examination:        General appearance:  alert, cooperative and no distress  Mental Status:  oriented to person, place and time and normal affect  Lungs:  clear to auscultation bilaterally, normal effort  Heart:  regular rate and rhythm, no murmur  Abdomen:  soft, nontender, nondistended, normal bowel sounds, no masses, hepatomegaly, splenomegaly  Extremities:  no edema, redness, tenderness in the calves  Skin:  no gross lesions, rashes, induration    Assessment:        Hospital Problems           Last Modified POA    * (Principal) Closed left hip fracture, initial encounter ([facility] [address] 6/23/2022 Yes    Alcoholism ([facility] [address] 6/23/2022 Yes    Overview Signed 6/23/2022  7:44 AM by WISAM Sena NP     Last Assessment & Plan:   Formatting of this note might be different from the original.  Multivitamins   IV fluids         COPD without exacerbation ([facility] [address] 6/23/2022 Yes    Essential hypertension 6/23/2022 Yes    Overview Signed 6/23/2022  7:44 AM by WISAM Sena NP     Last Assessment & Plan:   Formatting of this note is different from the original.  Blood pressures are mostly well controlled. Continue current medication regimen. Will monitor. BP Readings from Last 3 Encounters:   05/27/18 (!) 113/58   05/04/18 (!) 162/69   03/18/17 (!) 148/70         Mixed hyperlipidemia 6/23/2022 Yes    Overview Signed 6/23/2022  7:44 AM by WISAM Sena NP     Last Assessment & Plan:   Formatting of this note might be different from the original.  Continue home meds         First degree AV block 6/23/2022 Yes    Chronic anticoagulation 6/23/2022 Yes    Pre-op evaluation 6/23/2022 Yes          Plan:        1. Transfuse 1 unit of packed cells today, recheck H&H afterwards  2. PT and OT  3. GI DVT prophylaxis  4. Hold Eliquis this morning  5. Monitor and control blood pressure  6. Home cardiac medications as ordered  7.  Discharge planning to skilled facility pending progress    Michelle Gates DO  6/27/2022  8:45 AM

## 2022-06-27 NOTE — PLAN OF CARE
Problem: Discharge Planning  Goal: Discharge to home or other facility with appropriate resources  6/27/2022 1133 by Devon Shea RN  Outcome: Progressing  6/27/2022 0443 by Radha Luz RN  Outcome: Progressing  Flowsheets (Taken 6/26/2022 2330)  Discharge to home or other facility with appropriate resources:   Identify barriers to discharge with patient and caregiver   Arrange for needed discharge resources and transportation as appropriate   Identify discharge learning needs (meds, wound care, etc)     Problem: Pain  Goal: Verbalizes/displays adequate comfort level or baseline comfort level  6/27/2022 1133 by Devon Shea RN  Outcome: Progressing  6/27/2022 0443 by Radha Luz RN  Outcome: Progressing     Problem: Skin/Tissue Integrity  Goal: Absence of new skin breakdown  Description: 1. Monitor for areas of redness and/or skin breakdown  2. Assess vascular access sites hourly  3. Every 4-6 hours minimum:  Change oxygen saturation probe site  4. Every 4-6 hours:  If on nasal continuous positive airway pressure, respiratory therapy assess nares and determine need for appliance change or resting period.   6/27/2022 1133 by Devon Shea RN  Outcome: Progressing  6/27/2022 0443 by Radha Luz RN  Outcome: Progressing     Problem: Safety - Adult  Goal: Free from fall injury  6/27/2022 1133 by Devon Shea RN  Outcome: Progressing  6/27/2022 0443 by Radha Luz RN  Outcome: Progressing  Flowsheets (Taken 6/27/2022 0432)  Free From Fall Injury: Instruct family/caregiver on patient safety     Problem: ABCDS Injury Assessment  Goal: Absence of physical injury  6/27/2022 1133 by Devon Shea RN  Outcome: Progressing  6/27/2022 0443 by Radha Luz RN  Outcome: Progressing  Flowsheets (Taken 6/27/2022 0432)  Absence of Physical Injury: Implement safety measures based on patient assessment     Problem: Respiratory - Adult  Goal: Achieves optimal ventilation and oxygenation  6/27/2022 1133 by Josué Leiva RN  Outcome: Progressing  6/27/2022 0443 by Roselyn Neal RN  Outcome: Progressing  Flowsheets (Taken 6/26/2022 2330)  Achieves optimal ventilation and oxygenation: Assess for changes in respiratory status     Problem: Skin/Tissue Integrity - Adult  Goal: Skin integrity remains intact  6/27/2022 1133 by Josué Leiva RN  Outcome: Progressing  6/27/2022 0443 by Roselyn Neal RN  Outcome: Progressing  Flowsheets (Taken 6/26/2022 2330)  Skin Integrity Remains Intact:   Monitor for areas of redness and/or skin breakdown   Assess vascular access sites hourly  Goal: Incisions, wounds, or drain sites healing without S/S of infection  6/27/2022 1133 by Josué Leiva RN  Outcome: Progressing  6/27/2022 0443 by Roselyn Neal RN  Outcome: Progressing  Flowsheets (Taken 6/26/2022 2330)  Incisions, Wounds, or Drain Sites Healing Without Sign and Symptoms of Infection:   TWICE DAILY: Assess and document skin integrity   TWICE DAILY: Assess and document dressing/incision, wound bed, drain sites and surrounding tissue  Goal: Oral mucous membranes remain intact  6/27/2022 1133 by Josué Leiva RN  Outcome: Progressing  6/27/2022 0443 by Roselyn Neal RN  Outcome: Progressing  Flowsheets (Taken 6/26/2022 2330)  Oral Mucous Membranes Remain Intact:   Assess oral mucosa and hygiene practices   Implement preventative oral hygiene regimen     Problem: Musculoskeletal - Adult  Goal: Return mobility to safest level of function  6/27/2022 1133 by Josué Leiva RN  Outcome: Progressing  6/27/2022 0443 by Roselyn Neal RN  Outcome: Progressing  Flowsheets (Taken 6/26/2022 2330)  Return Mobility to Safest Level of Function:   Assess patient stability and activity tolerance for standing, transferring and ambulating with or without assistive devices   Assist with transfers and ambulation using safe patient handling equipment as needed   Ensure adequate protection for wounds/incisions during mobilization   Obtain physical therapy/occupational therapy consults as needed   Instruct patient/family in ordered activity level  Goal: Maintain proper alignment of affected body part  6/27/2022 1133 by Josué Leiva RN  Outcome: Progressing  6/27/2022 0443 by Roselyn Neal RN  Outcome: Progressing  Flowsheets (Taken 6/26/2022 2330)  Maintain proper alignment of affected body part:   Support and protect limb and body alignment per provider's orders   Instruct and reinforce with patient and family use of appropriate assistive device and precautions (e.g. spinal or hip dislocation precautions)  Goal: Return ADL status to a safe level of function  6/27/2022 1133 by Josué Leiva RN  Outcome: Progressing  6/27/2022 0443 by Roselyn Neal RN  Outcome: Progressing  Flowsheets (Taken 6/26/2022 2330)  Return ADL Status to a Safe Level of Function:   Administer medication as ordered   Assess activities of daily living deficits and provide assistive devices as needed   Obtain physical therapy/occupational therapy consults as needed   Assist and instruct patient to increase activity and self care as tolerated     Problem: Hematologic - Adult  Goal: Maintains hematologic stability  6/27/2022 1133 by Josué Leiva RN  Outcome: Progressing  6/27/2022 0443 by Roselyn Neal RN  Outcome: Progressing  Flowsheets (Taken 6/26/2022 2330)  Maintains hematologic stability:   Assess for signs and symptoms of bleeding or hemorrhage   Monitor labs for bleeding or clotting disorders     Problem: Neurosensory - Adult  Goal: Achieves stable or improved neurological status  6/27/2022 1133 by Josué Leiva RN  Outcome: Progressing  6/27/2022 0443 by Roselyn Neal RN  Outcome: Progressing  Flowsheets (Taken 6/26/2022 2330)  Achieves stable or improved neurological status: Assess for and report changes in neurological status     Problem: Cardiovascular - Adult  Goal: Maintains optimal cardiac output and hemodynamic stability  6/27/2022 1133 by Taty Murry RN  Outcome: Progressing  6/27/2022 0443 by Marlee Salvador RN  Outcome: Progressing  Flowsheets (Taken 6/26/2022 2330)  Maintains optimal cardiac output and hemodynamic stability: Monitor blood pressure and heart rate     Problem: Genitourinary - Adult  Goal: Absence of urinary retention  6/27/2022 1133 by Taty Murry RN  Outcome: Progressing  6/27/2022 0443 by Marlee Salvador RN  Outcome: Progressing  Flowsheets (Taken 6/26/2022 2330)  Absence of urinary retention:   Assess patients ability to void and empty bladder   Monitor intake/output and perform bladder scan as needed

## 2022-06-27 NOTE — DISCHARGE SUMMARY
[facility]  Office: [address], DO, Fish Rocha, DO, Yanely Benjamin, DO, Pb Amaya Blood, DO, Pari Briseno MD, Carin Arzola MD, Dolores Jeff MD, Yang Holcomb MD, Rajeev Bolanos MD, Carly Manuel MD, Jesus Nichols MD, Jasmeet Barbour, DO, Eladia Meyers MD,  Jean-Pierre Abdul, DO, Vianey Cunha MD, Caitlin Salaazr MD, Mario Mckinney DO, Ryne Hercules MD, Citlaly Osborne MD, Rik Johnson DO, Gay Brewer MD, Fransisca House MD, Murray Benítez, [facility], [facility], CNP, Ayah Rodriguez, [facility], Nai Hardy, [facility], Jacky Escamilla, [facility], Michael Cope, CNP, Rowan Oppenheim, PA-C, Alexandra Munoz, [facility], Bertha Christopher, CNP, Ruma Jackson, CNP, Claudeen Quarry, CNP, Navarro Carter, CNS, Yosef Bernardo, [facility], Rosangela Wahl, CNP, Ernesto Chavez, CNP, Dylan Blair, [facility]    Discharge Summary     Patient ID: Ruby Schmid  :  1945   MRN: [id number]     ACCOUNT:  [de-identified]   Patient's PCP: No primary care provider on file. Admit Date: 2022   Discharge Date: 2022     Length of Stay: 5  Code Status:  DNR-CCA  Admitting Physician: Mimi Banerjee DO  Discharge Physician: Mimi Banerjee DO     Active Discharge Diagnoses:     Hospital Problem Lists:  Principal Problem:    Closed left hip fracture, initial encounter [facility])  Active Problems:    Alcoholism ([facility] [address]    COPD without exacerbation ([facility] [address]    Essential hypertension    Mixed hyperlipidemia    First degree AV block    Chronic anticoagulation    Pre-op evaluation  Resolved Problems:    * No resolved hospital problems.  *      Admission Condition:  fair     Discharged Condition: stable    Hospital Stay:     Hospital Course:  Ruby Schmid is a 76 y.o. female who was admitted for the management of  Closed left hip fracture, initial encounter [facility]) , presented to ER with Fall and Hip Pain    This is a 68-year-old female that sustained an external fall at home when she got twisted up in her walker and fell. She had onset of left hip pain and was found to have left hip fracture. Subsequently she was admitted for pain control and orthopedic consultation. She was cleared for surgery underwent ORIF of her hip without immediate complication. Her postoperative course was complicated by blood loss anemia requiring transfusion. Upon initial evaluation she had evidence of anemia and did have iron deficiency and did receive IV iron supplementation while hospitalized. Postoperatively she was progressing with therapy with improvement in activity tolerance. Significant therapeutic interventions: ORIF hip, transfusion of packed cells, IV iron replacement    Significant Diagnostic Studies:   Labs / Micro:  CBC:   Lab Results   Component Value Date    WBC 4.9 06/27/2022    RBC 1.98 06/27/2022    HGB 8.9 06/27/2022    HCT 27.3 06/27/2022    MCV 98.5 06/27/2022    MCH 31.8 06/27/2022    MCHC 32.3 06/27/2022    RDW 13.3 06/27/2022     06/27/2022     BMP:    Lab Results   Component Value Date    GLUCOSE 110 06/26/2022     06/26/2022    K 3.9 06/26/2022     06/26/2022    CO2 26 06/26/2022    ANIONGAP 8 06/26/2022    BUN 10 06/26/2022    CREATININE 0.73 06/26/2022    BUNCRER 14 06/26/2022    CALCIUM 9.3 06/26/2022    LABGLOM >60 06/26/2022    GFRAA >60 06/26/2022    GFR      06/26/2022        Radiology:  XR HIP LEFT (2-3 VIEWS)    Result Date: 6/24/2022  Status post left hip internal fixation of a hip fracture     XR FEMUR LEFT (MIN 2 VIEWS)    Result Date: 6/23/2022  1. Redemonstration of impacted, angulated intertrochanteric femur fracture. 2.  Findings compatible with old trauma in the distal femoral metaphysis and prior surgery in the proximal tibia. XR CHEST 1 VIEW    Result Date: 6/23/2022  Chronic findings in the chest without acute airspace disease identified.      XR HIP 2-3 VW W PELVIS LEFT    Result Date: 6/22/2022  Impacted and angulated intertrochanteric left femur fracture. Consultations:    Consults:     Final Specialist Recommendations/Findings:   IP CONSULT TO ORTHOPEDIC SURGERY  IP CONSULT TO INTERNAL MEDICINE  IP CONSULT TO SOCIAL WORK      The patient was seen and examined on day of discharge and this discharge summary is in conjunction with any daily progress note from day of discharge. Discharge plan:     Disposition: To a non-[facility]    Physician Follow Up:   PCP 1 week, orthopedics as directed  No follow-up provider specified. Requiring Further Evaluation/Follow Up POST HOSPITALIZATION/Incidental Findings: CBC and BMP in 1 week    Diet: cardiac diet    Activity: As tolerated    Instructions to Patient: Take medications as prescribed    Discharge Medications:      Medication List      START taking these medications    HYDROcodone-acetaminophen 5-325 MG per tablet  Commonly known as: Norco  Take 1 tablet by mouth every 6 hours as needed for Pain for up to 7 days. Do not exceed 3000 mg of Acetaminophen in 24 hrs. CHANGE how you take these medications    * docusate 100 MG Caps  Commonly known as: COLACE, DULCOLAX  What changed: Another medication with the same name was added. Make sure you understand how and when to take each. * docusate sodium 100 MG capsule  Commonly known as: COLACE  Take 1 capsule by mouth 2 times daily  What changed: You were already taking a medication with the same name, and this prescription was added. Make sure you understand how and when to take each. * This list has 2 medication(s) that are the same as other medications prescribed for you. Read the directions carefully, and ask your doctor or other care provider to review them with you.             CONTINUE taking these medications    acetaminophen 500 MG tablet  Commonly known as: TYLENOL     aspirin 81 MG EC tablet     citalopram 20 MG tablet  Commonly known as: CELEXA     cyanocobalamin 1000 MCG/ML injection     Eliquis 5 MG Tabs tablet  Generic drug: apixaban     fenofibrate 145 MG tablet  Commonly known as: TRICOR     lovastatin 40 MG tablet  Commonly known as: MEVACOR     magnesium oxide 400 MG tablet  Commonly known as: MAG-OX     metoprolol succinate 50 MG extended release tablet  Commonly known as: TOPROL XL     omeprazole 20 MG delayed release capsule  Commonly known as: PRILOSEC     Ventolin  (90 Base) MCG/ACT inhaler  Generic drug: albuterol sulfate HFA           Where to Get Your Medications      You can get these medications from any pharmacy    Bring a paper prescription for each of these medications  · HYDROcodone-acetaminophen 5-325 MG per tablet     Information about where to get these medications is not yet available    Ask your nurse or doctor about these medications  · docusate sodium 100 MG capsule         No discharge procedures on file. Time Spent on discharge is  27 minutes in patient examination, evaluation, counseling as well as medication reconciliation, prescriptions for required medications, discharge plan and follow up. Electronically signed by   Candelaria Doll DO  6/27/2022  4:24 PM      Thank you Dr. Mark Vance primary care provider on file. for the opportunity to be involved in this patient's care.

## 2022-06-27 NOTE — PROGRESS NOTES
DATE: 2022    NAME: Heri Pwoell  MRN: [id number]   : 1945    Patient not seen this date for Physical Therapy due to:      [] Cancel by RN or physician due to:    [] Hemodialysis    [] Critical Lab Value Level     [x] Blood transfusion in progress HGB 6.3    [] Acute or unstable cardiovascular status   _MAP < 55 or more than >115  _HR < 40 or > 130    [] Acute or unstable pulmonary status   -FiO2 > 60%   _RR < 5 or >40    _O2 sats < 85%    [] Strict Bedrest    [] Off Unit for surgery or procedure    [] Off Unit for testing       [] Pending imaging to R/O fracture    [] Refusal by Patient      [] Other      [] PT being discontinued at this time. Patient independent. No further needs. [] PT being discontinued at this time as the patient has been transferred to hospice care. No further needs.       ROXANNA BELLO, PTA

## 2022-06-27 NOTE — PROGRESS NOTES
The patient is discharged at this time; transported to [facility] facility per [facility]. Writer called facility and was placed on hold, call never picked up. Unable to give verbal report; ROMI completed.

## 2022-06-27 NOTE — PROGRESS NOTES
Blood transfusion completed at this time; the patient tolerated infusion without any difficulty, no transfusion reaction noted or reported.

## 2022-06-28 ENCOUNTER — HOSPITAL ENCOUNTER (OUTPATIENT)
Age: 77
Setting detail: SPECIMEN
Discharge: HOME | End: 2022-06-28

## 2022-06-28 LAB
ABO/RH: NORMAL
ANION GAP SERPL CALCULATED.3IONS-SCNC: 8 MMOL/L (ref 9–17)
ANTIBODY SCREEN: NEGATIVE
ARM BAND NUMBER: NORMAL
BLD PROD TYP BPU: NORMAL
BPU ID: NORMAL
BUN BLDV-MCNC: 14 MG/DL (ref 8–23)
BUN/CREAT BLD: 19 (ref 9–20)
CALCIUM SERPL-MCNC: 9 MG/DL (ref 8.6–10.4)
CHLORIDE BLD-SCNC: 103 MMOL/L (ref 98–107)
CO2: 24 MMOL/L (ref 20–31)
CREAT SERPL-MCNC: 0.72 MG/DL (ref 0.5–0.9)
CROSSMATCH RESULT: NORMAL
DISPENSE STATUS BLOOD BANK: NORMAL
EXPIRATION DATE: NORMAL
GFR AFRICAN AMERICAN: >60 ML/MIN
GFR NON-AFRICAN AMERICAN: >60 ML/MIN
GFR SERPL CREATININE-BSD FRML MDRD: ABNORMAL ML/MIN/{1.73_M2}
GLUCOSE BLD-MCNC: 100 MG/DL (ref 70–99)
HCT VFR BLD CALC: 26 % (ref 36.3–47.1)
HEMOGLOBIN: 8.6 G/DL (ref 11.9–15.1)
MCH RBC QN AUTO: 30.9 PG (ref 25.2–33.5)
MCHC RBC AUTO-ENTMCNC: 33.1 G/DL (ref 28.4–34.8)
MCV RBC AUTO: 93.5 FL (ref 82.6–102.9)
NRBC AUTOMATED: 0 PER 100 WBC
PDW BLD-RTO: 15.8 % (ref 11.8–14.4)
PLATELET # BLD: 236 K/UL (ref 138–453)
PMV BLD AUTO: 8.9 FL (ref 8.1–13.5)
POTASSIUM SERPL-SCNC: 4.1 MMOL/L (ref 3.7–5.3)
RBC # BLD: 2.78 M/UL (ref 3.95–5.11)
SODIUM BLD-SCNC: 135 MMOL/L (ref 135–144)
TRANSFUSION STATUS: NORMAL
UNIT DIVISION: 0
WBC # BLD: 5.4 K/UL (ref 3.5–11.3)

## 2022-06-28 PROCEDURE — 85027 COMPLETE CBC AUTOMATED: CPT

## 2022-06-28 PROCEDURE — 80048 BASIC METABOLIC PNL TOTAL CA: CPT

## 2022-06-28 PROCEDURE — 36415 COLL VENOUS BLD VENIPUNCTURE: CPT

## 2022-06-28 PROCEDURE — P9603 ONE-WAY ALLOW PRORATED MILES: HCPCS

## 2022-07-01 NOTE — ADT AUTH CERT
atient Demographics    Name Patient ID [facility] Gender Identity Birth Date   Madeline Guerrero [id number]  Female [birth year] (76 yrs)     Address Phone Email Employer    [address] [phone] (H) --  [address] Race Occupation Emp Status    LENNY White (non-) -- Retired      Reg Status PCP Date Last Verified Next Review Date    Verified -- 22      Admission Date Discharge Date Admitting Provider     22 Lewis Fields DO       Marital Status Rastafarian      Single None        Emergency Contact 1   Maureen Scripture Brescia (H)       [address] [facility] [de-identified]  [address] [facility] Name/Sex/Relation Subscriber  Subscriber Address/Phone Subscriber Emp/Emp Phone   1. [employer] MEDICARE   Lucie Hampton - Female   (Self) 1945 [address], [place]  [address]   [phone](M) RETIRED      Utilization Reviews         Hip Fracture, Open Repair - Care Day 3 (2022) by Marcelle Alejandra RN       Review Status Review Entered   Completed 2022 08:22      Criteria Review      Care Day: 3 Care Date: 2022 Level of Care: Telemetry    Guideline Day 3    Level Of Care    (X) Floor    2022 8:22 AM EDT by Karla Shanks      TELEMETRY    Clinical Status    (X) * Hemodynamic stability    2022 8:22 AM EDT by Karla Shanks      P 75  /45  130/41 115/43  128/48  101/38 122/41    (X) * Mental status improved or at baseline    2022 8:22 AM EDT by Sacha Terrazas      Oriented to person, place and time.     ( ) * Pain absent or managed    2022 8:22 AM EDT by Karla Shanks      PAIN LEVEL 7  HYDROcodone-acetaminophen (NORCO) 5-325 MG per tablet 2 tabletX3    Activity    (X) * Progressive ambulation    2022 8:22 AM EDT by Sacha Terrazas      Recommend daily inpatient skilled therapy with PT/OT    ( ) * Increasing activity tolerated    2022 8:22 AM EDT by Sacha Terrazas   Pt currently functioning below baseline. Routes    (X) Parenteral or oral medications    6/29/2022 8:22 AM EDT by Sherman Hammans, Emely      atorvastatin (LIPITOR) ,citalopram (CELEXA) ,docusate sodium (COLACE) ,fenofibrate (TRIGLIDE) ,folic acid (FOLVITE),pantoprazole (PROTONIX),thiamine mononitrate    (X) Diet as tolerated    6/29/2022 8:22 AM EDT by Jeannette Barboza      ADULT DIET; Regular    Interventions    (X) Gait training if needed    6/29/2022 8:22 AM EDT by Jeannette Barboza      NDT Treatment: Gait; Lower extremity; Sitting; Standing    (X) Physical therapy    (X) DVT prophylaxis    6/29/2022 8:22 AM EDT by Sherman Hammans, Emely      apixaban (ELIQUIS) tablet 5 mg    Medications    ( ) * PCA and epidural analgesics absent    6/29/2022 8:22 AM EDT by Sherman Hammans, Emely      No  prescribed    * Milestone   Additional Notes   DATE:    6/26/22      Pertinent Updates:   Patient with postoperative pain is relatively well controlled.           Vitals:1   T  97.9 (36.6)    R 18   P 75   /45  130/41 115/43  128/48  101/38 122/41   SPO2 99  (Room air)    PAIN 7    lb (72.6 kg)      Abnl/Pertinent Labs/Radiology/Diagnostic Studies:   Anion Gap: 8 (L)   GLUCOSE, FASTING,GF: 110 (H)   RBC: 2.29 (L)   Hemoglobin Quant: 7.2 (L)   Hematocrit: 22.7 (L)   Eosinophils %: 5 (H)      Physical Exam:   MSK:   Dressing/splint in place, clean/dry/intact   Grossly NVI   -compartments soft/compressible and Able to plantarflex/dorsiflex ankle appropriately, with intact sensation throughout          MD Consults/Assessments & Plans:   IM:   Plan : 1. Discussed CODE STATUS, patient has prior living will and would not want heroic measures.  CODE STATUS changed to DNR CC arrest without intubation per her request and prior wishes. Plan: 2. Continue PT and OT   3.GI and DVT prophylaxis    4.continue present analgesics   5. Oxygen and aerosols as   6. Continue home Eliquis   7. Trend labs, transfuse as needed          ORTHO:   Plan:    -ok to reinforce/change dressing PRN   -WB'ing status: WBAT   -Pain control:     -signed pain medicine prescription in chart   -DVT ppx:  Early mobilization   -continue previous/current regimen      Medications:   apixaban (ELIQUIS) tablet 5 mg   Dose: 5 mg   Freq: 2 TIMES DAILY Route: PO      atorvastatin (LIPITOR) tablet 10 mg   Dose: 10 mg   Freq: DAILY Route: PO      citalopram (CELEXA) tablet 20 mg   Dose: 20 mg   Freq: NIGHTLY Route: PO      docusate sodium (COLACE) capsule 100 mg   Dose: 100 mg   Freq: 2 TIMES DAILY Route: PO      fenofibrate (TRIGLIDE) tablet 160 mg   Dose: 160 mg   Freq: DAILY Route: PO      folic acid (FOLVITE) tablet 1 mg   Dose: 1 mg   Freq: DAILY Route: PO      magnesium oxide (MAG-OX) tablet 400 mg   Dose: 400 mg   Freq: DAILY Route: PO      metoprolol succinate (TOPROL XL) extended release tablet 50 mg   Dose: 50 mg   Freq: DAILY Route: PO      pantoprazole (PROTONIX) tablet 40 mg   Dose: 40 mg   Freq: DAILY BEFORE BREAKFAST Route: PO      polyethylene glycol (GLYCOLAX) packet 17 g   Dose: 17 g   Freq: DAILY Route: PO      thiamine mononitrate tablet 100 mg   Dose: 100 mg   Freq: DAILY Route: PO      HYDROcodone-acetaminophen (NORCO) 5-325 MG per tablet 2 tabletX3   Dose: 2 tablet   Freq: EVERY 4 HOURS PRN Route: PO         Orders:   ADULT DIET; Regular    Telemetry monitoring         PT/OT/SLP/CM Assessments or Notes:,    PT;   Pt currently functioning below baseline.  Recommend daily inpatient skilled therapy at time of discharge to maximize long term outcomes and prevent re-admission.  Please refer to AM-PAC score for current level of function.                 Hip Fracture, Open Repair - Care Day 2 (6/25/2022) by Comfort Burrows RN       Review Status Review Entered   Completed 6/27/2022 10:47      Criteria Review      Care Day: 2 Care Date: 6/25/2022 Level of Care: Telemetry    Guideline Day 2    Level Of Care    (X) Floor    6/27/2022 10:47 AM EDT by Papi Naik MS w/ tele    Clinical Status    (X) * Procedure completed    (X) * Tolerates mobilization    (X) * No evidence of new neurovascular compromise of lower extremity    Activity    (X) * Up to chair    (X) * Limited ambulation    6/27/2022 10:47 AM EDT by Shanti Sawyer      FWB as rafael    Routes    (X) Diet as tolerated    6/27/2022 10:47 AM EDT by Shanti Sawyer      Reg    (X) Parenteral or oral medications    6/27/2022 10:47 AM EDT by Shanti Sawyer      meds noted below    Interventions    (X) Gait training    (X) Physical therapy    6/27/2022 10:47 AM EDT by Shanti Sawyer      PT/OT    (X) DVT prophylaxis    6/27/2022 10:47 AM EDT by Shanti Sawyer      SCD    * Milestone   Additional Notes   DATE: 6/25/22         Pertinent Updates:   IV abiox, IVF,   H&H   7.8/24.7, previous 9.3/28.6 ( on 6/24) - 10.7/33.5 ( on 6.22)         Vitals:97.5 (36.4) 16 82 144/51          Abnl/Pertinent Labs/Radiology/Diagnostic Studies:      6/25/2022 09:38   Sodium: 132 (L)   GLUCOSE, FASTING,GF: 232 (H)   RBC: 2.47 (L)   Hemoglobin Quant: 7.8 (L)   Hematocrit: 24.7 (L)   Eosinophils %: 0 (L)   Lymphocytes: 16 (L)   Absolute Lymph #: 1.00 (L)      Physical Exam:   Mental Status:  oriented to person, place and time and normal affect   Lungs:  clear to auscultation bilaterally, normal effort   Heart:  regular rate and rhythm, no murmur   Abdomen:  soft, nontender, nondistended, normal bowel sounds, no masses, hepatomegaly, splenomegaly   Extremities:  no edema, redness, tenderness in the calves      MD Consults/Assessments & Plans:      IM   Plan:   1. PT and OT per postoperative protocol   2. Monitor and control blood pressure   3. Check labs today   4. Oxygen and aerosols as needed   5. Incentive spirometry   6. GI and DVT prophylaxis, resume home Eliquis when okay with orthopedics   7. Social service for discharge planning, will likely benefit from short-term rehab stay   8. Monitor for signs or symptoms of withdrawal   9. Discussed possible need for transfusion, patient has had transfusion of the past and consented to blood products if needed      -------------      Medications:   Scheduled Medications   · polyethylene glycol 17 g Oral Daily   · docusate sodium 100 mg Oral BID   · thiamine 100 mg Oral Daily   · folic acid 1 mg Oral Daily   citalopram (CELEXA) tablet 20 mg po hs   fenofibrate (TRIGLIDE) tablet 160 mg po qd   magnesium oxide (MAG-OX) tablet 400 mg po qd   metoprolol succinate (TOPROL XL) extended release tablet 50 mg po qd   ceFAZolin (ANCEF) 2000 mg in dextrose 5 % 50 mL IVPB q8h ( 1 dose)       Continuous Infusions:    · sodium chloride 75 mL/hr      PRN   HYDROcodone-acetaminophen (NORCO) 5-325 MG per tablet 2 tablet PO x4         PT/OT/SLP/CM Assessments or Notes:   PT/OT:Pt currently functioning below baseline.  Recommend daily inpatient skilled therapy at time of discharge to maximize long term outcomes and prevent re-admission.

## 2022-07-01 NOTE — ADT AUTH CERT
atient Demographics    Name Patient ID [facility] Gender Identity Birth Date   Liam Mcmillan [id number]  Female 41 (76 yrs)     Address Phone Email Employer    [phone] [phone] (H) --  [address] Race Occupation Emp Status    LENNY White (non-) -- Retired      Reg Status PCP Date Last Verified Next Review Date    Verified -- 22      Admission Date Discharge Date Admitting Provider     22 Shereen Huizar DO       Marital Status Zoroastrian      Single None        Emergency Contact 1   Yaw Done Brescia (H)       [address] [de-identified]  [address] [facility] Name/Sex/Relation Subscriber  Subscriber Address/Phone Subscriber Emp/Emp Phone   1. BCBS MEDICARE   Rashawn Mathews - Female   (Self) 1945 [address] [facility], [address] [place] [address] Drive  [address] [phone](I) RETIRED      Utilization Reviews         Hip Fracture, Open Repair - Care Day 3 (2022) by Dwayne Greene RN       Review Status Review Entered   Completed 2022 08:22      Criteria Review      Care Day: 3 Care Date: 2022 Level of Care: Telemetry    Guideline Day 3    Level Of Care    (X) Floor    2022 8:22 AM EDT by Karla Franklin      TELEMETRY    Clinical Status    (X) * Hemodynamic stability    2022 8:22 AM EDT by Karla Franklin      P 75  /45  130/41 115/43  128/48  101/38 122/41    (X) * Mental status improved or at baseline    2022 8:22 AM EDT by Myranda Wilkinson      Oriented to person, place and time.     ( ) * Pain absent or managed    2022 8:22 AM EDT by Karla Franklin      PAIN LEVEL 7  HYDROcodone-acetaminophen (NORCO) 5-325 MG per tablet 2 tabletX3    Activity    (X) * Progressive ambulation    2022 8:22 AM EDT by Myrnada Wilkinson      Recommend daily inpatient skilled therapy with PT/OT    ( ) * Increasing activity tolerated    2022 8:22 AM EDT by Myranda Wilkinson   Pt currently functioning below baseline. Routes    (X) Parenteral or oral medications    6/29/2022 8:22 AM EDT by Karla Truong      atorvastatin (LIPITOR) ,citalopram (CELEXA) ,docusate sodium (COLACE) ,fenofibrate (TRIGLIDE) ,folic acid (FOLVITE),pantoprazole (PROTONIX),thiamine mononitrate    (X) Diet as tolerated    6/29/2022 8:22 AM EDT by Gisele Herr      ADULT DIET; Regular    Interventions    (X) Gait training if needed    6/29/2022 8:22 AM EDT by Gisele Herr      NDT Treatment: Gait; Lower extremity; Sitting; Standing    (X) Physical therapy    (X) DVT prophylaxis    6/29/2022 8:22 AM EDT by Karla Truong      apixaban (ELIQUIS) tablet 5 mg    Medications    ( ) * PCA and epidural analgesics absent    6/29/2022 8:22 AM EDT by Karla Truong      No  prescribed    * Milestone   Additional Notes   DATE:    6/26/22      Pertinent Updates:   Patient with postoperative pain is relatively well controlled.           Vitals:1   T  97.9 (36.6)    R 18   P 75   /45  130/41 115/43  128/48  101/38 122/41   SPO2 99  (Room air)    PAIN 7    lb (72.6 kg)      Abnl/Pertinent Labs/Radiology/Diagnostic Studies:   Anion Gap: 8 (L)   GLUCOSE, FASTING,GF: 110 (H)   RBC: 2.29 (L)   Hemoglobin Quant: 7.2 (L)   Hematocrit: 22.7 (L)   Eosinophils %: 5 (H)      Physical Exam:   MSK:   Dressing/splint in place, clean/dry/intact   Grossly NVI   -compartments soft/compressible and Able to plantarflex/dorsiflex ankle appropriately, with intact sensation throughout          MD Consults/Assessments & Plans:   IM:   Plan : 1. Discussed CODE STATUS, patient has prior living will and would not want heroic measures.  CODE STATUS changed to DNR CC arrest without intubation per her request and prior wishes. Plan: 2. Continue PT and OT   3.GI and DVT prophylaxis    4.continue present analgesics   5. Oxygen and aerosols as   6. Continue home Eliquis   7. Trend labs, transfuse as needed          ORTHO:   Plan:    -ok to reinforce/change dressing PRN   -WB'ing status: WBAT   -Pain control:     -signed pain medicine prescription in chart   -DVT ppx:  Early mobilization   -continue previous/current regimen      Medications:   apixaban (ELIQUIS) tablet 5 mg   Dose: 5 mg   Freq: 2 TIMES DAILY Route: PO      atorvastatin (LIPITOR) tablet 10 mg   Dose: 10 mg   Freq: DAILY Route: PO      citalopram (CELEXA) tablet 20 mg   Dose: 20 mg   Freq: NIGHTLY Route: PO      docusate sodium (COLACE) capsule 100 mg   Dose: 100 mg   Freq: 2 TIMES DAILY Route: PO      fenofibrate (TRIGLIDE) tablet 160 mg   Dose: 160 mg   Freq: DAILY Route: PO      folic acid (FOLVITE) tablet 1 mg   Dose: 1 mg   Freq: DAILY Route: PO      magnesium oxide (MAG-OX) tablet 400 mg   Dose: 400 mg   Freq: DAILY Route: PO      metoprolol succinate (TOPROL XL) extended release tablet 50 mg   Dose: 50 mg   Freq: DAILY Route: PO      pantoprazole (PROTONIX) tablet 40 mg   Dose: 40 mg   Freq: DAILY BEFORE BREAKFAST Route: PO      polyethylene glycol (GLYCOLAX) packet 17 g   Dose: 17 g   Freq: DAILY Route: PO      thiamine mononitrate tablet 100 mg   Dose: 100 mg   Freq: DAILY Route: PO      HYDROcodone-acetaminophen (NORCO) 5-325 MG per tablet 2 tabletX3   Dose: 2 tablet   Freq: EVERY 4 HOURS PRN Route: PO         Orders:   ADULT DIET; Regular    Telemetry monitoring         PT/OT/SLP/CM Assessments or Notes:,    PT;   Pt currently functioning below baseline.  Recommend daily inpatient skilled therapy at time of discharge to maximize long term outcomes and prevent re-admission.  Please refer to AM-PAC score for current level of function.                 Hip Fracture, Open Repair - Care Day 2 (6/25/2022) by Keyur Houser RN       Review Status Review Entered   Completed 6/27/2022 10:47      Criteria Review      Care Day: 2 Care Date: 6/25/2022 Level of Care: Telemetry    Guideline Day 2    Level Of Care    (X) Floor    6/27/2022 10:47 AM EDT by Ulisses Quiroga MS w/ tele    Clinical Status    (X) * Procedure completed    (X) * Tolerates mobilization    (X) * No evidence of new neurovascular compromise of lower extremity    Activity    (X) * Up to chair    (X) * Limited ambulation    6/27/2022 10:47 AM EDT by Marian Watson      FWB as rafael    Routes    (X) Diet as tolerated    6/27/2022 10:47 AM EDT by Marian Watson      Reg    (X) Parenteral or oral medications    6/27/2022 10:47 AM EDT by Marian Watson      meds noted below    Interventions    (X) Gait training    (X) Physical therapy    6/27/2022 10:47 AM EDT by Marian Watson      PT/OT    (X) DVT prophylaxis    6/27/2022 10:47 AM EDT by Marian Watson      SCD    * Milestone   Additional Notes   DATE: 6/25/22         Pertinent Updates:   IV abiox, IVF,   H&H   7.8/24.7, previous 9.3/28.6 ( on 6/24) - 10.7/33.5 ( on 6.22)         Vitals:97.5 (36.4) 16 82 144/51          Abnl/Pertinent Labs/Radiology/Diagnostic Studies:      6/25/2022 09:38   Sodium: 132 (L)   GLUCOSE, FASTING,GF: 232 (H)   RBC: 2.47 (L)   Hemoglobin Quant: 7.8 (L)   Hematocrit: 24.7 (L)   Eosinophils %: 0 (L)   Lymphocytes: 16 (L)   Absolute Lymph #: 1.00 (L)      Physical Exam:   Mental Status:  oriented to person, place and time and normal affect   Lungs:  clear to auscultation bilaterally, normal effort   Heart:  regular rate and rhythm, no murmur   Abdomen:  soft, nontender, nondistended, normal bowel sounds, no masses, hepatomegaly, splenomegaly   Extremities:  no edema, redness, tenderness in the calves      MD Consults/Assessments & Plans:      IM   Plan:   1. PT and OT per postoperative protocol   2. Monitor and control blood pressure   3. Check labs today   4. Oxygen and aerosols as needed   5. Incentive spirometry   6. GI and DVT prophylaxis, resume home Eliquis when okay with orthopedics   7. Social service for discharge planning, will likely benefit from short-term rehab stay   8. Monitor for signs or symptoms of withdrawal   9.

## 2022-07-06 ENCOUNTER — TELEPHONE (OUTPATIENT)
Dept: ORTHOPEDIC SURGERY | Age: 77
End: 2022-07-06

## 2022-07-06 DIAGNOSIS — M25.552 LEFT HIP PAIN: Primary | ICD-10-CM

## 2022-07-06 NOTE — TELEPHONE ENCOUNTER
Dr. Bruna Ortez,   Pt has covid and had to cancel her appt for tomorrow. She still has staples in from her surgery. She is currently at the [facility]. Do you want them to remove her staples, or do you want to wait until her follow up with you on 7/19? Please advise.

## 2022-07-06 NOTE — TELEPHONE ENCOUNTER
Spoke with daughter. Mother is currently at the [facility]. Told daughter we would call her back and let her know what's going on. She stated understanding.

## 2022-07-06 NOTE — TELEPHONE ENCOUNTER
Daughter calling, Mother had an appointment tomorrow but had to reschedule due to a covid outbreak. New appointment is 7/19, patient still has staple in  Her hip, can it wait that long? Dr. Tomas Evans did the surgery. Please call daughter at [phone].

## 2022-07-07 ENCOUNTER — TELEPHONE (OUTPATIENT)
Dept: ORTHOPEDIC SURGERY | Age: 77
End: 2022-07-07

## 2022-07-07 NOTE — TELEPHONE ENCOUNTER
Attempted to reach out to the [facility], no answer. Dr. Laurie Soto is ok with the nurse at the facility removing the staples. If they need an order I will need a fax number.

## 2022-07-19 ENCOUNTER — OFFICE VISIT (OUTPATIENT)
Dept: ORTHOPEDIC SURGERY | Age: 77
End: 2022-07-19

## 2022-07-19 VITALS — OXYGEN SATURATION: 100 % | RESPIRATION RATE: 16 BRPM | WEIGHT: 168 LBS | BODY MASS INDEX: 27 KG/M2 | HEIGHT: 66 IN

## 2022-07-19 DIAGNOSIS — S72.009D HIP FRACTURE, UNSPECIFIED LATERALITY, CLOSED, WITH ROUTINE HEALING, SUBSEQUENT ENCOUNTER: Primary | ICD-10-CM

## 2022-07-19 PROCEDURE — 99024 POSTOP FOLLOW-UP VISIT: CPT | Performed by: ORTHOPAEDIC SURGERY

## 2022-07-19 NOTE — PROGRESS NOTES
[address] [facility] SP[facility]TS MEDICINE  [address]  [facility] [address]  Dept: [phone]    Ambulatory Orthopedic Postoperative Visit  Preoperative Diagnosis:  Left closed intertrochanteric hip fracture  History of alcoholism  Atrial fibrillation  COPD  Cerebrovascular disease  History of malnutrition  Body mass index is 25.99 kg/m². Postoperative Diagnosis:  same     Procedures Performed:  (6/24/2022)  Open treatment of Left hip intertrochanteric femur fracture with cephalomedullary nail      SUBJECTIVE:     The patient returns post op from the above stated procedure. Reports doing well overall, reports improved pain, denies wound drainage/issues, fevers/chills/night sweats, calf swelling/pain, chest pain, shortness of breath. She reports that she has been walking with physical therapy using a walker, and reports that she is doing well. OBJECTIVE:  Resp 16   Ht 5' 6\" (1.676 m)   Wt 168 lb (76.2 kg)   SpO2 100%   BMI 27.12 kg/m²    NAD, resting comfortably  Incisions clean/dry/intact, no erythema/dehiscence/drainage  Sensation to light touch grossly intact throughout  Warm and well perfused  Grossly neurovascularly intact distally  No signs of infection  No calf swelling/tenderness      RADIOLOGY:   7/19/2022 FINDINGS:  Three weightbearing views (AP Pelvis, AP hip and Lateral) of the left hip were obtained in the office today and reviewed, revealing hip fracture with intact well seated hardware without evidence of loosening. No interval displacement of hip fracture. IMPRESSION: Hip fracture as above    Electronically signed by Sina Bettencourt MD     ASSESSMENT AND PLAN:     3 weeks s/p above, doing well overall      She has a history of a left hip intertrochanteric fracture, sustained on 6/22/2022. Notably, she has a complex past medical history.   She has a history of COPD, cerebrovascular disease, history of AV block and atrial fibrillation, history of intra-abdominal abscess, history of malnutrition, history of alcoholism. []  Sutures/staples were removed and steri-strips applied      --previously removed at outside facility    Precautions: Weightbearing as tolerated on the Left lower extremity             -             [x]  Physical Therapy/Home exercises        Immobilization:      []  Splint/Cast               []  CAM boot                    []  Comfortable shoe    []  Other:               DVT ppx:   [x]  Early mobilization              [x]  Medication as prescribed (resume her Eliquis for A. fib)                 []  No chemical ppx needed; mechanical only             -     Pain control:  Medication as prescribed (dosing and quantity) indicated for acute postoperative pain control             -     Special concerns:       []            [x]  Avoid strenuous activity/pain provoking maneuvers and high-impact repetitive exercises       All questions were answered and the patient agrees with the above plan. The patient will return to clinic in 4 weeks with repeat left hip x-rays         No follow-ups on file. No orders of the defined types were placed in this encounter. No orders of the defined types were placed in this encounter. Scotty Velasquez MD  Orthopedic Surgery        Please excuse any typos/errors, as this note was created with the assistance of voice recognition software. While intending to generate a document that actually reflects the content of the visit, the document can still have some errors including those of syntax and sound-a-like substitutions which may escape proof reading. In such instances, actual meaning can be extrapolated by context.

## 2022-07-19 NOTE — PROGRESS NOTES
Physician Progress Note      PATIENTRandolm Bosworth  CSN #:                  [id number]  :                       1945  ADMIT DATE:       2022 10:28 PM  Chhaya Mota DATE:        2022 7:00 PM  RESPONDING  PROVIDER #:        Olayinka CASTILLO DO          QUERY TEXT:    Pt admitted following ORIF and has anemia documented. If possible, please   document in progress notes and discharge summary further specificity regarding   the acuity and type of anemia:    The medical record reflects the following:  Risk Factors: ***  Clinical Indicators: Hgb on  of 10.7, Hgb of 6.3 o , received 1 unit   of PRBC, EBL noted to be minimal, per Discharge summary documented as \" She   was cleared for surgery underwent ORIF of her hip without immediate   complication. Her postoperative course was complicated by blood loss anemia   requiring transfusion. \"  Treatment: PRBCs, labs, monitoring, IVF  Options provided:  -- Anemia due to acute blood loss  -- Anemia due to chronic blood loss  -- Other - I will add my own diagnosis  -- Disagree - Not applicable / Not valid  -- Disagree - Clinically unable to determine / Unknown  -- Refer to Clinical Documentation Reviewer    PROVIDER RESPONSE TEXT:    This patient has acute blood loss anemia. Query created by:  Jacqui Davison on 2022 6:45 AM      Electronically signed by:  Randy Corona DO 2022 6:49 AM

## 2022-08-17 ENCOUNTER — TELEPHONE (OUTPATIENT)
Dept: ORTHOPEDIC SURGERY | Age: 77
End: 2022-08-17

## 2022-08-17 DIAGNOSIS — S72.009D HIP FRACTURE, UNSPECIFIED LATERALITY, CLOSED, WITH ROUTINE HEALING, SUBSEQUENT ENCOUNTER: Primary | ICD-10-CM

## 2022-08-17 NOTE — TELEPHONE ENCOUNTER
Pt still in [facility] care - pt refusing to come in for further postop appts per daughter -  was schd for 8/17 and text response to canc - in calling [address] advised me pt states feeling well, does not want to come in for any other appt.  - I advised stepdaughter i'd let DR Olimpia Linton know the situation./bb

## 2022-08-18 ENCOUNTER — OFFICE VISIT (OUTPATIENT)
Dept: ORTHOPEDIC SURGERY | Age: 77
End: 2022-08-18

## 2022-08-18 VITALS — WEIGHT: 168 LBS | HEIGHT: 66 IN | BODY MASS INDEX: 27 KG/M2 | RESPIRATION RATE: 16 BRPM | OXYGEN SATURATION: 100 %

## 2022-08-18 DIAGNOSIS — S72.009D HIP FRACTURE, UNSPECIFIED LATERALITY, CLOSED, WITH ROUTINE HEALING, SUBSEQUENT ENCOUNTER: Primary | ICD-10-CM

## 2022-08-18 PROCEDURE — 99024 POSTOP FOLLOW-UP VISIT: CPT | Performed by: ORTHOPAEDIC SURGERY

## 2022-08-18 NOTE — PROGRESS NOTES
[address] [facility]TS MEDICINE  [address]  [facility] [address]  Dept: [phone]    Ambulatory Orthopedic Postoperative Visit  Preoperative Diagnosis:  Left closed intertrochanteric hip fracture  History of alcoholism  Atrial fibrillation  COPD  Cerebrovascular disease  History of malnutrition  Body mass index is 25.99 kg/m². Postoperative Diagnosis:  same     Procedures Performed:  (6/24/2022)  Open treatment of Left hip intertrochanteric femur fracture with cephalomedullary nail      SUBJECTIVE:     The patient returns post op from the above stated procedure. Reports doing well overall, reports improved pain, denies wound drainage/issues, fevers/chills/night sweats, calf swelling/pain, chest pain, shortness of breath. She is ambulating today using a walker. She denies any pain in her left hip, but requests a larger shoe lift for the left (she has been using the current shoe lift for approximately 4 years). OBJECTIVE:  Resp 16   Ht 5' 6\" (1.676 m)   Wt 168 lb (76.2 kg)   SpO2 100%   BMI 27.12 kg/m²    NAD, resting comfortably  Incisions clean/dry/intact, no erythema/dehiscence/drainage  Sensation to light touch grossly intact throughout  Warm and well perfused  Grossly neurovascularly intact distally  No signs of infection  No calf swelling/tenderness      RADIOLOGY:   8/18/2022 FINDINGS:  Three weightbearing views (AP Pelvis, AP hip and Lateral) of the left hip were obtained in the office today and reviewed, revealing hip fracture with intact well seated hardware without evidence of loosening. No interval displacement of hip fracture. Interval healing is noted. IMPRESSION: Hip fracture as above    Electronically signed by Tito Feliz MD     ASSESSMENT AND PLAN:     8 weeks s/p above, doing well overall      She has a history of a left hip intertrochanteric fracture, sustained on 6/22/2022. Notably, she has a complex past medical history. She has a history of COPD, cerebrovascular disease, history of AV block and atrial fibrillation, history of intra-abdominal abscess, history of malnutrition, history of alcoholism. She also has a longstanding history of a limb length discrepancy (left lower extremity shorter than the right). Precautions: Weightbearing as tolerated on the Left lower extremity             -             [x]  Physical Therapy/Home exercises        Immobilization:      []  Splint/Cast               []  CAM boot                    []  Comfortable shoe    []  Other:               DVT ppx:   [x]  Early mobilization              [x]  Medication as prescribed (resume her Eliquis for A. fib)                 []  No chemical ppx needed; mechanical only             -     Pain control:  Medication as prescribed (dosing and quantity) indicated for acute postoperative pain control             -     Special concerns:       []            [x]  Avoid strenuous activity/pain provoking maneuvers and high-impact repetitive exercises     -She was provided a referral to prosthetics and orthotics for a custom left lower extremity shoe lift    All questions were answered and the patient agrees with the above plan. The patient will return to clinic in 6 weeks with repeat left hip x-rays         No follow-ups on file. No orders of the defined types were placed in this encounter. No orders of the defined types were placed in this encounter. Gal Rainey MD  Orthopedic Surgery        Please excuse any typos/errors, as this note was created with the assistance of voice recognition software. While intending to generate a document that actually reflects the content of the visit, the document can still have some errors including those of syntax and sound-a-like substitutions which may escape proof reading. In such instances, actual meaning can be extrapolated by context.

## 2023-04-17 ENCOUNTER — APPOINTMENT (OUTPATIENT)
Dept: GENERAL RADIOLOGY | Facility: CLINIC | Age: 78
End: 2023-04-17
Payer: MEDICARE

## 2023-04-17 ENCOUNTER — HOSPITAL ENCOUNTER (OUTPATIENT)
Age: 78
Setting detail: OBSERVATION
Discharge: NURSING, REHAB OR LONG-TERM CARE | End: 2023-04-20
Attending: EMERGENCY MEDICINE | Admitting: INTERNAL MEDICINE
Payer: MEDICARE

## 2023-04-17 ENCOUNTER — APPOINTMENT (OUTPATIENT)
Dept: CT IMAGING | Facility: CLINIC | Age: 78
End: 2023-04-17
Payer: MEDICARE

## 2023-04-17 DIAGNOSIS — S89.92XA INJURY OF LEFT KNEE, INITIAL ENCOUNTER: Primary | ICD-10-CM
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
## 2023-04-17 DIAGNOSIS — F10.920 ACUTE ALCOHOLIC INTOXICATION WITHOUT COMPLICATION (HCC): ICD-10-CM

## 2023-04-17 DIAGNOSIS — M25.462 KNEE EFFUSION, LEFT: ICD-10-CM
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
## 2023-04-17 DIAGNOSIS — R26.2 UNABLE TO AMBULATE: ICD-10-CM

## 2023-04-17 LAB
ABSOLUTE EOS #: 0.1 K/UL (ref 0–0.4)
ABSOLUTE LYMPH #: 1.7 K/UL (ref 1–4.8)
ABSOLUTE MONO #: 0.4 K/UL (ref 0.1–1.2)
ALBUMIN SERPL-MCNC: 4 G/DL (ref 3.5–5.2)
ALBUMIN/GLOBULIN RATIO: 1.7 (ref 1–2.5)
ALP SERPL-CCNC: 32 U/L (ref 35–104)
ALT SERPL-CCNC: 8 U/L (ref 5–33)
ANION GAP SERPL CALCULATED.3IONS-SCNC: 11 MMOL/L (ref 9–17)
AST SERPL-CCNC: 14 U/L
BASOPHILS # BLD: 0 % (ref 0–2)
BASOPHILS ABSOLUTE: 0 K/UL (ref 0–0.2)
BILIRUB SERPL-MCNC: 0.2 MG/DL (ref 0.3–1.2)
BUN SERPL-MCNC: 15 MG/DL (ref 8–23)
CALCIUM SERPL-MCNC: 9.1 MG/DL (ref 8.6–10.4)
CHLORIDE SERPL-SCNC: 96 MMOL/L (ref 98–107)
CO2 SERPL-SCNC: 21 MMOL/L (ref 20–31)
CREAT SERPL-MCNC: 1.1 MG/DL (ref 0.5–0.9)
EOSINOPHILS RELATIVE PERCENT: 1 % (ref 1–4)
ETHANOL PERCENT: 0.26 %
ETHANOL: 256 MG/DL
GFR SERPL CREATININE-BSD FRML MDRD: 51 ML/MIN/1.73M2
GLUCOSE SERPL-MCNC: 111 MG/DL (ref 70–99)
HCT VFR BLD AUTO: 24.5 % (ref 36–46)
HGB BLD-MCNC: 8.1 G/DL (ref 12–16)
INR PPP: 1.1
LYMPHOCYTES # BLD: 27 % (ref 24–44)
MAGNESIUM SERPL-MCNC: 1.9 MG/DL (ref 1.6–2.6)
MCH RBC QN AUTO: 27.2 PG (ref 26–34)
MCHC RBC AUTO-ENTMCNC: 32.9 G/DL (ref 31–37)
MCV RBC AUTO: 82.4 FL (ref 80–100)
MONOCYTES # BLD: 7 % (ref 2–11)
PARTIAL THROMBOPLASTIN TIME: 23.8 SEC (ref 21.3–31.3)
PDW BLD-RTO: 15.8 % (ref 12.5–15.4)
PLATELET # BLD AUTO: 294 K/UL (ref 140–450)
PMV BLD AUTO: 6.1 FL (ref 6–12)
POTASSIUM SERPL-SCNC: 4.2 MMOL/L (ref 3.7–5.3)
PROT SERPL-MCNC: 6.3 G/DL (ref 6.4–8.3)
PROTHROMBIN TIME: 11 SEC (ref 9.4–12.6)
RBC # BLD: 2.98 M/UL (ref 4–5.2)
SEG NEUTROPHILS: 65 % (ref 36–66)
SEGMENTED NEUTROPHILS ABSOLUTE COUNT: 4 K/UL (ref 1.8–7.7)
SODIUM SERPL-SCNC: 128 MMOL/L (ref 135–144)
TROPONIN I SERPL DL<=0.01 NG/ML-MCNC: 8 NG/L (ref 0–14)
WBC # BLD AUTO: 6.2 K/UL (ref 3.5–11)

## 2023-04-17 PROCEDURE — 85025 COMPLETE CBC W/AUTO DIFF WBC: CPT

## 2023-04-17 PROCEDURE — 85730 THROMBOPLASTIN TIME PARTIAL: CPT

## 2023-04-17 PROCEDURE — 85610 PROTHROMBIN TIME: CPT

## 2023-04-17 PROCEDURE — 84484 ASSAY OF TROPONIN QUANT: CPT

## 2023-04-17 PROCEDURE — 99285 EMERGENCY DEPT VISIT HI MDM: CPT

## 2023-04-17 PROCEDURE — 83735 ASSAY OF MAGNESIUM: CPT

## 2023-04-17 PROCEDURE — 36415 COLL VENOUS BLD VENIPUNCTURE: CPT

## 2023-04-17 PROCEDURE — 72125 CT NECK SPINE W/O DYE: CPT

## 2023-04-17 PROCEDURE — 70450 CT HEAD/BRAIN W/O DYE: CPT

## 2023-04-17 PROCEDURE — 73502 X-RAY EXAM HIP UNI 2-3 VIEWS: CPT

## 2023-04-17 PROCEDURE — G0480 DRUG TEST DEF 1-7 CLASSES: HCPCS

## 2023-04-17 PROCEDURE — 71045 X-RAY EXAM CHEST 1 VIEW: CPT

## 2023-04-17 PROCEDURE — 93005 ELECTROCARDIOGRAM TRACING: CPT | Performed by: EMERGENCY MEDICINE

## 2023-04-17 PROCEDURE — 80053 COMPREHEN METABOLIC PANEL: CPT

## 2023-04-17 PROCEDURE — 73130 X-RAY EXAM OF HAND: CPT

## 2023-04-17 ASSESSMENT — ENCOUNTER SYMPTOMS
EYE PAIN: 0
ABDOMINAL PAIN: 0
BACK PAIN: 0
SORE THROAT: 0
COUGH: 0
RHINORRHEA: 0
SHORTNESS OF BREATH: 0
DIARRHEA: 0
VOMITING: 0
NAUSEA: 0

## 2023-04-18 ENCOUNTER — APPOINTMENT (OUTPATIENT)
Dept: GENERAL RADIOLOGY | Facility: CLINIC | Age: 78
End: 2023-04-18
Payer: MEDICARE

## 2023-04-18 ENCOUNTER — APPOINTMENT (OUTPATIENT)
Dept: CT IMAGING | Age: 78
End: 2023-04-18
Payer: MEDICARE

## 2023-04-18 PROBLEM — F16.920: Status: RESOLVED | Noted: 2023-04-18 | Resolved: 2023-04-18
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
## 2023-04-18 PROBLEM — R26.2 INABILITY TO AMBULATE DUE TO KNEE: Status: ACTIVE | Noted: 2023-04-18

## 2023-04-18 PROBLEM — M25.462 KNEE EFFUSION, LEFT: Status: ACTIVE | Noted: 2023-04-18
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
## 2023-04-18 PROBLEM — F16.920: Status: ACTIVE | Noted: 2023-04-18

## 2023-04-18 PROBLEM — N30.00 ACUTE CYSTITIS WITHOUT HEMATURIA: Status: ACTIVE | Noted: 2023-04-18

## 2023-04-18 PROBLEM — I48.0 PAROXYSMAL ATRIAL FIBRILLATION (HCC): Status: ACTIVE | Noted: 2019-04-01

## 2023-04-18 PROBLEM — R26.2 UNABLE TO AMBULATE: Status: ACTIVE | Noted: 2023-04-18

## 2023-04-18 PROBLEM — F10.920 ACUTE ALCOHOLIC INTOXICATION WITHOUT COMPLICATION (HCC): Status: ACTIVE | Noted: 2023-04-18

## 2023-04-18 LAB
ANION GAP SERPL CALCULATED.3IONS-SCNC: 9 MMOL/L (ref 9–17)
BACTERIA: ABNORMAL
BILIRUBIN URINE: NEGATIVE
BUN SERPL-MCNC: 13 MG/DL (ref 8–23)
BUN/CREAT BLD: 13 (ref 9–20)
CALCIUM SERPL-MCNC: 8.6 MG/DL (ref 8.6–10.4)
CHLORIDE SERPL-SCNC: 101 MMOL/L (ref 98–107)
CO2 SERPL-SCNC: 21 MMOL/L (ref 20–31)
COLOR: YELLOW
CREAT SERPL-MCNC: 0.97 MG/DL (ref 0.5–0.9)
EKG ATRIAL RATE: 63 BPM
EKG P AXIS: 93 DEGREES
EKG P-R INTERVAL: 266 MS
EKG Q-T INTERVAL: 424 MS
EKG QRS DURATION: 80 MS
EKG QTC CALCULATION (BAZETT): 433 MS
EKG T AXIS: 55 DEGREES
EKG VENTRICULAR RATE: 63 BPM
EPITHELIAL CELLS UA: ABNORMAL /HPF (ref 0–5)
GFR SERPL CREATININE-BSD FRML MDRD: 59 ML/MIN/1.73M2
GLUCOSE SERPL-MCNC: 98 MG/DL (ref 70–99)
GLUCOSE UR STRIP.AUTO-MCNC: NEGATIVE MG/DL
KETONES UR STRIP.AUTO-MCNC: ABNORMAL MG/DL
LEUKOCYTE ESTERASE UR QL STRIP.AUTO: ABNORMAL
NITRITE UR QL STRIP.AUTO: POSITIVE
OTHER OBSERVATIONS UA: ABNORMAL
POTASSIUM SERPL-SCNC: 4.7 MMOL/L (ref 3.7–5.3)
PROT UR STRIP.AUTO-MCNC: 5 MG/DL (ref 5–8)
PROT UR STRIP.AUTO-MCNC: NEGATIVE MG/DL
RBC CLUMPS #/AREA URNS AUTO: ABNORMAL /HPF (ref 0–2)
SODIUM SERPL-SCNC: 131 MMOL/L (ref 135–144)
SPECIFIC GRAVITY UA: 1.01 (ref 1–1.03)
TROPONIN I SERPL DL<=0.01 NG/ML-MCNC: 8 NG/L (ref 0–14)
TURBIDITY: ABNORMAL
URINE HGB: NEGATIVE
UROBILINOGEN, URINE: NORMAL
WBC UA: ABNORMAL /HPF (ref 0–5)

## 2023-04-18 PROCEDURE — 97167 OT EVAL HIGH COMPLEX 60 MIN: CPT

## 2023-04-18 PROCEDURE — 99222 1ST HOSP IP/OBS MODERATE 55: CPT | Performed by: INTERNAL MEDICINE

## 2023-04-18 PROCEDURE — 97110 THERAPEUTIC EXERCISES: CPT

## 2023-04-18 PROCEDURE — 87077 CULTURE AEROBIC IDENTIFY: CPT

## 2023-04-18 PROCEDURE — 2580000003 HC RX 258: Performed by: NURSE PRACTITIONER

## 2023-04-18 PROCEDURE — 96365 THER/PROPH/DIAG IV INF INIT: CPT

## 2023-04-18 PROCEDURE — 97530 THERAPEUTIC ACTIVITIES: CPT

## 2023-04-18 PROCEDURE — 97116 GAIT TRAINING THERAPY: CPT

## 2023-04-18 PROCEDURE — 6360000002 HC RX W HCPCS: Performed by: EMERGENCY MEDICINE

## 2023-04-18 PROCEDURE — 6370000000 HC RX 637 (ALT 250 FOR IP): Performed by: INTERNAL MEDICINE

## 2023-04-18 PROCEDURE — G0378 HOSPITAL OBSERVATION PER HR: HCPCS

## 2023-04-18 PROCEDURE — 97535 SELF CARE MNGMENT TRAINING: CPT

## 2023-04-18 PROCEDURE — 96361 HYDRATE IV INFUSION ADD-ON: CPT

## 2023-04-18 PROCEDURE — 81001 URINALYSIS AUTO W/SCOPE: CPT

## 2023-04-18 PROCEDURE — 80048 BASIC METABOLIC PNL TOTAL CA: CPT

## 2023-04-18 PROCEDURE — 2580000003 HC RX 258: Performed by: EMERGENCY MEDICINE

## 2023-04-18 PROCEDURE — 6370000000 HC RX 637 (ALT 250 FOR IP): Performed by: NURSE PRACTITIONER

## 2023-04-18 PROCEDURE — 6360000002 HC RX W HCPCS: Performed by: NURSE PRACTITIONER

## 2023-04-18 PROCEDURE — 96375 TX/PRO/DX INJ NEW DRUG ADDON: CPT

## 2023-04-18 PROCEDURE — APPSS30 APP SPLIT SHARED TIME 16-30 MINUTES: Performed by: NURSE PRACTITIONER

## 2023-04-18 PROCEDURE — 36415 COLL VENOUS BLD VENIPUNCTURE: CPT

## 2023-04-18 PROCEDURE — 97163 PT EVAL HIGH COMPLEX 45 MIN: CPT

## 2023-04-18 PROCEDURE — 84484 ASSAY OF TROPONIN QUANT: CPT

## 2023-04-18 PROCEDURE — 73562 X-RAY EXAM OF KNEE 3: CPT

## 2023-04-18 PROCEDURE — 73700 CT LOWER EXTREMITY W/O DYE: CPT

## 2023-04-18 PROCEDURE — 87186 SC STD MICRODIL/AGAR DIL: CPT

## 2023-04-18 PROCEDURE — 96374 THER/PROPH/DIAG INJ IV PUSH: CPT

## 2023-04-18 PROCEDURE — 87086 URINE CULTURE/COLONY COUNT: CPT

## 2023-04-18 RX ORDER — POTASSIUM CHLORIDE 7.45 MG/ML
10 INJECTION INTRAVENOUS PRN
Status: DISCONTINUED | OUTPATIENT
Start: 2023-04-18 | End: 2023-04-20 | Stop reason: HOSPADM

## 2023-04-18 RX ORDER — ONDANSETRON 4 MG/1
4 TABLET, ORALLY DISINTEGRATING ORAL EVERY 8 HOURS PRN
Status: DISCONTINUED | OUTPATIENT
Start: 2023-04-18 | End: 2023-04-20 | Stop reason: HOSPADM

## 2023-04-18 RX ORDER — ASPIRIN 81 MG/1
81 TABLET ORAL DAILY
Status: DISCONTINUED | OUTPATIENT
Start: 2023-04-18 | End: 2023-04-20 | Stop reason: HOSPADM

## 2023-04-18 RX ORDER — TRAMADOL HYDROCHLORIDE 50 MG/1
50 TABLET ORAL EVERY 4 HOURS PRN
Status: DISCONTINUED | OUTPATIENT
Start: 2023-04-18 | End: 2023-04-20 | Stop reason: HOSPADM

## 2023-04-18 RX ORDER — OMEPRAZOLE 20 MG/1
20 CAPSULE, DELAYED RELEASE ORAL
Status: DISCONTINUED | OUTPATIENT
Start: 2023-04-18 | End: 2023-04-20 | Stop reason: HOSPADM

## 2023-04-18 RX ORDER — FOLIC ACID 1 MG/1
1 TABLET ORAL DAILY
Status: DISCONTINUED | OUTPATIENT
Start: 2023-04-18 | End: 2023-04-20 | Stop reason: HOSPADM

## 2023-04-18 RX ORDER — SODIUM CHLORIDE 0.9 % (FLUSH) 0.9 %
10 SYRINGE (ML) INJECTION PRN
Status: DISCONTINUED | OUTPATIENT
Start: 2023-04-18 | End: 2023-04-20 | Stop reason: HOSPADM

## 2023-04-18 RX ORDER — CITALOPRAM 10 MG/1
10 TABLET ORAL
COMMUNITY

## 2023-04-18 RX ORDER — ACETAMINOPHEN 325 MG/1
650 TABLET ORAL EVERY 6 HOURS PRN
Status: DISCONTINUED | OUTPATIENT
Start: 2023-04-18 | End: 2023-04-20 | Stop reason: HOSPADM

## 2023-04-18 RX ORDER — 0.9 % SODIUM CHLORIDE 0.9 %
1000 INTRAVENOUS SOLUTION INTRAVENOUS ONCE
Status: COMPLETED | OUTPATIENT
Start: 2023-04-18 | End: 2023-04-18

## 2023-04-18 RX ORDER — SODIUM CHLORIDE 9 MG/ML
INJECTION, SOLUTION INTRAVENOUS PRN
Status: DISCONTINUED | OUTPATIENT
Start: 2023-04-18 | End: 2023-04-20 | Stop reason: HOSPADM

## 2023-04-18 RX ORDER — FENOFIBRATE 145 MG/1
145 TABLET, COATED ORAL DAILY
Status: DISCONTINUED | OUTPATIENT
Start: 2023-04-18 | End: 2023-04-20 | Stop reason: HOSPADM

## 2023-04-18 RX ORDER — POTASSIUM CHLORIDE 20 MEQ/1
40 TABLET, EXTENDED RELEASE ORAL PRN
Status: DISCONTINUED | OUTPATIENT
Start: 2023-04-18 | End: 2023-04-20 | Stop reason: HOSPADM

## 2023-04-18 RX ORDER — GAUZE BANDAGE 2" X 2"
100 BANDAGE TOPICAL DAILY
Status: DISCONTINUED | OUTPATIENT
Start: 2023-04-18 | End: 2023-04-20 | Stop reason: HOSPADM

## 2023-04-18 RX ORDER — PNV NO.95/FERROUS FUM/FOLIC AC 28MG-0.8MG
250 TABLET ORAL DAILY
Status: DISCONTINUED | OUTPATIENT
Start: 2023-04-18 | End: 2023-04-20 | Stop reason: HOSPADM

## 2023-04-18 RX ORDER — DOCUSATE SODIUM 100 MG/1
100 CAPSULE, LIQUID FILLED ORAL DAILY
Status: DISCONTINUED | OUTPATIENT
Start: 2023-04-18 | End: 2023-04-20 | Stop reason: HOSPADM

## 2023-04-18 RX ORDER — ALBUTEROL SULFATE 90 UG/1
2 AEROSOL, METERED RESPIRATORY (INHALATION) EVERY 6 HOURS PRN
Status: DISCONTINUED | OUTPATIENT
Start: 2023-04-18 | End: 2023-04-20 | Stop reason: HOSPADM

## 2023-04-18 RX ORDER — FENTANYL CITRATE 0.05 MG/ML
50 INJECTION, SOLUTION INTRAMUSCULAR; INTRAVENOUS ONCE
Status: COMPLETED | OUTPATIENT
Start: 2023-04-18 | End: 2023-04-18

## 2023-04-18 RX ORDER — LOVASTATIN 40 MG/1
40 TABLET ORAL DAILY
Status: DISCONTINUED | OUTPATIENT
Start: 2023-04-18 | End: 2023-04-20 | Stop reason: HOSPADM

## 2023-04-18 RX ORDER — POLYETHYLENE GLYCOL 3350 17 G/17G
17 POWDER, FOR SOLUTION ORAL DAILY PRN
Status: DISCONTINUED | OUTPATIENT
Start: 2023-04-18 | End: 2023-04-20 | Stop reason: HOSPADM

## 2023-04-18 RX ORDER — SODIUM CHLORIDE 0.9 % (FLUSH) 0.9 %
5-40 SYRINGE (ML) INJECTION EVERY 12 HOURS SCHEDULED
Status: DISCONTINUED | OUTPATIENT
Start: 2023-04-18 | End: 2023-04-20 | Stop reason: HOSPADM

## 2023-04-18 RX ORDER — ACETAMINOPHEN 650 MG/1
650 SUPPOSITORY RECTAL EVERY 6 HOURS PRN
Status: DISCONTINUED | OUTPATIENT
Start: 2023-04-18 | End: 2023-04-20 | Stop reason: HOSPADM

## 2023-04-18 RX ORDER — ONDANSETRON 2 MG/ML
4 INJECTION INTRAMUSCULAR; INTRAVENOUS EVERY 6 HOURS PRN
Status: DISCONTINUED | OUTPATIENT
Start: 2023-04-18 | End: 2023-04-20 | Stop reason: HOSPADM

## 2023-04-18 RX ORDER — MAGNESIUM SULFATE 1 G/100ML
1000 INJECTION INTRAVENOUS PRN
Status: DISCONTINUED | OUTPATIENT
Start: 2023-04-18 | End: 2023-04-20 | Stop reason: HOSPADM

## 2023-04-18 RX ORDER — METOPROLOL SUCCINATE 50 MG/1
50 TABLET, EXTENDED RELEASE ORAL DAILY
Status: DISCONTINUED | OUTPATIENT
Start: 2023-04-18 | End: 2023-04-20 | Stop reason: HOSPADM

## 2023-04-18 RX ADMIN — LOVASTATIN 40 MG: 40 TABLET ORAL at 19:46

## 2023-04-18 RX ADMIN — SODIUM CHLORIDE, PRESERVATIVE FREE 10 ML: 5 INJECTION INTRAVENOUS at 08:20

## 2023-04-18 RX ADMIN — FOLIC ACID 1 MG: 1 TABLET ORAL at 08:25

## 2023-04-18 RX ADMIN — SODIUM CHLORIDE 1000 ML: 9 INJECTION, SOLUTION INTRAVENOUS at 00:08

## 2023-04-18 RX ADMIN — ASPIRIN 81 MG: 81 TABLET ORAL at 14:03

## 2023-04-18 RX ADMIN — METOPROLOL SUCCINATE 50 MG: 50 TABLET, EXTENDED RELEASE ORAL at 14:03

## 2023-04-18 RX ADMIN — CEFTRIAXONE SODIUM 1000 MG: 1 INJECTION, POWDER, FOR SOLUTION INTRAMUSCULAR; INTRAVENOUS at 08:29

## 2023-04-18 RX ADMIN — THIAMINE HCL TAB 100 MG 100 MG: 100 TAB at 08:25

## 2023-04-18 RX ADMIN — OMEPRAZOLE 20 MG: 20 CAPSULE, DELAYED RELEASE ORAL at 14:03

## 2023-04-18 RX ADMIN — TRAMADOL HYDROCHLORIDE 50 MG: 50 TABLET, COATED ORAL at 19:46

## 2023-04-18 RX ADMIN — TRAMADOL HYDROCHLORIDE 50 MG: 50 TABLET, COATED ORAL at 05:09

## 2023-04-18 RX ADMIN — TRAMADOL HYDROCHLORIDE 50 MG: 50 TABLET, COATED ORAL at 11:09

## 2023-04-18 RX ADMIN — FENTANYL CITRATE 50 MCG: 0.05 INJECTION, SOLUTION INTRAMUSCULAR; INTRAVENOUS at 00:20

## 2023-04-18 RX ADMIN — TRAMADOL HYDROCHLORIDE 50 MG: 50 TABLET, COATED ORAL at 15:16

## 2023-04-18 RX ADMIN — DOCUSATE SODIUM 100 MG: 100 CAPSULE, LIQUID FILLED ORAL at 21:10

## 2023-04-18 RX ADMIN — Medication 250 MG: at 14:03

## 2023-04-18 RX ADMIN — FENOFIBRATE 145 MG: 145 TABLET, COATED ORAL at 14:03

## 2023-04-18 RX ADMIN — SODIUM CHLORIDE, PRESERVATIVE FREE 10 ML: 5 INJECTION INTRAVENOUS at 19:46

## 2023-04-18 ASSESSMENT — PAIN DESCRIPTION - LOCATION
LOCATION: LEG
LOCATION: KNEE
LOCATION: LEG

## 2023-04-18 ASSESSMENT — ENCOUNTER SYMPTOMS
RESPIRATORY NEGATIVE: 1
BACK PAIN: 0
EYES NEGATIVE: 1
COLOR CHANGE: 1
GASTROINTESTINAL NEGATIVE: 1

## 2023-04-18 ASSESSMENT — PAIN DESCRIPTION - ORIENTATION
ORIENTATION: LEFT

## 2023-04-18 ASSESSMENT — PAIN DESCRIPTION - DESCRIPTORS
DESCRIPTORS: ACHING;PRESSURE
DESCRIPTORS: ACHING

## 2023-04-18 ASSESSMENT — LIFESTYLE VARIABLES
HOW OFTEN DO YOU HAVE A DRINK CONTAINING ALCOHOL: 4 OR MORE TIMES A WEEK
HOW MANY STANDARD DRINKS CONTAINING ALCOHOL DO YOU HAVE ON A TYPICAL DAY: 3 OR 4

## 2023-04-18 ASSESSMENT — PAIN SCALES - GENERAL
PAINLEVEL_OUTOF10: 7
PAINLEVEL_OUTOF10: 10
PAINLEVEL_OUTOF10: 7
PAINLEVEL_OUTOF10: 8

## 2023-04-18 NOTE — ED NOTES
2nd trop obtained, labeled and sent.  Family and pt updated on Olga. He , [facility]  04/18/23 0013

## 2023-04-18 NOTE — ED NOTES
Attempt to get pt up to walk her. Pt unable to apply any pressure or stand on L knee. Pt states pain Is too bad. Pt placed back in bed and Dr. Brijesh Osuna notified.       Liberty Bartholomew, RN  04/18/23 4986

## 2023-04-18 NOTE — PLAN OF CARE
Problem: Discharge Planning  Goal: Discharge to home or other facility with appropriate resources  Outcome: Progressing     Problem: Safety - Adult  Goal: Free from fall injury  Outcome: Progressing     Problem: ABCDS Injury Assessment  Goal: Absence of physical injury  Outcome: Progressing  Note: Non-skid socks in place, up with assistance, bed in lowest position, bed exit & alarm as needed, provide toileting every 2 hours an d as needed. Problem: Skin/Tissue Integrity  Goal: Absence of new skin breakdown  Description: 1. Monitor for areas of redness and/or skin breakdown  2. Assess vascular access sites hourly  3. Every 4-6 hours minimum:  Change oxygen saturation probe site  4. Every 4-6 hours:  If on nasal continuous positive airway pressure, respiratory therapy assess nares and determine need for appliance change or resting period. Outcome: Progressing  Note: Continuing to monitor for skin integrity risks. Patient independent with turning/repositioning. Turning/repositioning encouraged at least once every 2 hrs, and prn basis. Hygiene care being completed independently per patient; assistance provided when deemed necessary. Problem: Pain  Goal: Verbalizes/displays adequate comfort level or baseline comfort level  Outcome: Progressing  Note: Monitoring pain with each assessment and prn. SANDEEP 0-10 pain scale utilized. Non-pharmacological measures to be encouraged prior to pharmacological measures.

## 2023-04-18 NOTE — ED NOTES
Pt presents to ED via ems. Pt is alert and oriented. Pt state she was getting into bed with her walker and had a mechanical fall at home. Pt denies hitting head or LOC. Pt is on eliquis daily. Pt complains of L hip/leg pain. Denies back or neck pain. Pt placed on monitor. Admits to using ETOH tonight.       Duane Fiddler, RN  04/18/23 2279

## 2023-04-18 NOTE — ED PROVIDER NOTES
hours as needed for Pain    ALBUTEROL SULFATE HFA (VENTOLIN HFA) 108 (90 BASE) MCG/ACT INHALER    Inhale 2 puffs into the lungs every 6 hours as needed for Wheezing or Shortness of Breath    ASPIRIN 81 MG EC TABLET    Take 81 mg by mouth daily    CITALOPRAM (CELEXA) 20 MG TABLET    Take 20 mg by mouth nightly     CYANOCOBALAMIN 1000 MCG/ML INJECTION    Inject 1,000 mcg into the muscle every 30 days Indications: on the  of the month     DOCUSATE (COLACE, DULCOLAX) 100 MG CAPS    Take 100 mg by mouth daily as needed (constipation)     DOCUSATE SODIUM (COLACE) 100 MG CAPSULE    Take 1 capsule by mouth 2 times daily    ELIQUIS 5 MG TABS TABLET    Take 5 mg by mouth 2 times daily     FENOFIBRATE (TRICOR) 145 MG TABLET    Take 145 mg by mouth daily    LOVASTATIN (MEVACOR) 40 MG TABLET    Take 40 mg by mouth nightly     MAGNESIUM OXIDE (MAG-OX) 400 MG TABLET    Take 400 mg by mouth daily    METOPROLOL SUCCINATE (TOPROL XL) 50 MG EXTENDED RELEASE TABLET    Take 50 mg by mouth daily    OMEPRAZOLE (PRILOSEC) 20 MG DELAYED RELEASE CAPSULE    Take 20 mg by mouth daily       ALLERGIES     is allergic to sulfamethoxazole-trimethoprim. FAMILY HISTORY     She indicated that her mother is . She indicated that her father is . She indicated that her sister is alive. family history includes Cancer in her father; Heart Disease in her mother; Hypertension in her sister; Stroke in her sister. SOCIAL HISTORY      reports that she quit smoking about 40 years ago. Her smoking use included cigarettes. She has never used smokeless tobacco. She reports current alcohol use. She reports that she does not use drugs. PHYSICAL EXAM     INITIAL VITALS:  weight is 70.8 kg (156 lb). Her oral temperature is 96.9 °F (36.1 °C). Her blood pressure is 104/55 (abnormal) and her pulse is 62. Her respiration is 16 and oxygen saturation is 98%. Physical Exam  Vitals reviewed.    Constitutional:       General: She is not in

## 2023-04-18 NOTE — ED NOTES
Pt accepted to [address] Awaiting bed assignment.       Lauren Velazquez RN  04/18/23 4040

## 2023-04-18 NOTE — PLAN OF CARE
Problem: Discharge Planning  Goal: Discharge to home or other facility with appropriate resources  4/18/2023 1747 by Nasima Del Rosario RN  Outcome: Progressing     Problem: Safety - Adult  Goal: Free from fall injury  4/18/2023 1747 by Nasima Del Rosario RN  Outcome: Progressing     Problem: ABCDS Injury Assessment  Goal: Absence of physical injury  4/18/2023 1747 by Nasima Del Rosario RN  Outcome: Progressing     Problem: Skin/Tissue Integrity  Goal: Absence of new skin breakdown  Description: 1. Monitor for areas of redness and/or skin breakdown  2. Assess vascular access sites hourly  3. Every 4-6 hours minimum:  Change oxygen saturation probe site  4. Every 4-6 hours:  If on nasal continuous positive airway pressure, respiratory therapy assess nares and determine need for appliance change or resting period.   4/18/2023 1747 by Nasima Del Rosario RN  Outcome: Progressing     Problem: Pain  Goal: Verbalizes/displays adequate comfort level or baseline comfort level  4/18/2023 1747 by Nasima Del Rosario RN  Outcome: Progressing

## 2023-04-18 NOTE — H&P
[facility]  Office: [address], DO, Lorenajosephine Woodruff, DO, Markeshia Cooper, DO, Kimmy Marie, DO, Td Parada MD, Mary Red MD, Marck Bingham MD, Heidi Camejo MD,  Josie Hernandez MD, Mckayla Auguste MD, Lori Reid, DO, Xi Hamm MD,  Amado Jeffrey MD, Barbara Rogers MD, Jack Hopkins DO, Mike Charles MD, Katie Alcantara MD, Nathalie Schmitz DO, Stefany Hazel MD, Shaista Pond MD, Yemi Bailey MD, Stu Chapman MD,  Dalton Gallo DO, Bishop Nupur MD,  Didier Tamez, CNP,  Maria Fernanda Sibley, CNP, Mai Amaya, CNP, Theresa Jamison, CNP,  Jolynn Lopez, [facility], Myrtle Luque, CNP, Luci Clay, CNP, Kasie Jones, CNP, Mercedez Keller, CNP, Dimitri Pan, CNP, Martir Lopez PA-C, Estefanía Brewer, CNS, Juan Luis Mercer, CNP, Sarkis Arevalo, [facility]    HISTORY AND PHYSICAL EXAMINATION            Date:   4/18/2023  Patient name:  Carmen Gallagher  Date of admission:  4/17/2023  9:56 PM  MRN:   [id number]  Account:  [de-identified]  YOB: 1945  PCP:    Porter Gaffney MD  Room:   [facility]/7157-10  Code Status:    DNR-CC    Chief Complaint:     Chief Complaint   Patient presents with    Fall    Hip Pain       History Obtained From:     patient    History of Present Illness:     Carmen Gallagher is a 76 y.o. Non- / non  female who presents with Fall and Hip Pain   and is admitted to the hospital for the management of Unable to ambulate. Patient says she had some wine last evening and when she was going to bed, she lost her balance while using her walker and fell. She denies hitting her head, denies syncope. She drinks 3 glasses of wine every evening, has a hx of  left leg fractures, left knee surgery, atrial fib and HTN. She was initially evaluated at the [facility] ED. Her BAL was 256, creat sl elevated, Na sl low at 128. She was given IVF and IV Fentanyl for pain.

## 2023-04-19 ENCOUNTER — APPOINTMENT (OUTPATIENT)
Dept: MRI IMAGING | Age: 78
End: 2023-04-19
Payer: MEDICARE

## 2023-04-19 LAB
ANION GAP SERPL CALCULATED.3IONS-SCNC: 8 MMOL/L (ref 9–17)
BUN SERPL-MCNC: 11 MG/DL (ref 8–23)
BUN/CREAT BLD: 12 (ref 9–20)
CALCIUM SERPL-MCNC: 9 MG/DL (ref 8.6–10.4)
CHLORIDE SERPL-SCNC: 100 MMOL/L (ref 98–107)
CO2 SERPL-SCNC: 23 MMOL/L (ref 20–31)
CREAT SERPL-MCNC: 0.94 MG/DL (ref 0.5–0.9)
GFR SERPL CREATININE-BSD FRML MDRD: >60 ML/MIN/1.73M2
GLUCOSE SERPL-MCNC: 131 MG/DL (ref 70–99)
MICROORGANISM SPEC CULT: ABNORMAL
POTASSIUM SERPL-SCNC: 4.4 MMOL/L (ref 3.7–5.3)
SODIUM SERPL-SCNC: 131 MMOL/L (ref 135–144)
SPECIMEN DESCRIPTION: ABNORMAL

## 2023-04-19 PROCEDURE — 97110 THERAPEUTIC EXERCISES: CPT

## 2023-04-19 PROCEDURE — 36415 COLL VENOUS BLD VENIPUNCTURE: CPT

## 2023-04-19 PROCEDURE — 96366 THER/PROPH/DIAG IV INF ADDON: CPT

## 2023-04-19 PROCEDURE — 97530 THERAPEUTIC ACTIVITIES: CPT

## 2023-04-19 PROCEDURE — 2580000003 HC RX 258: Performed by: NURSE PRACTITIONER

## 2023-04-19 PROCEDURE — 6370000000 HC RX 637 (ALT 250 FOR IP): Performed by: NURSE PRACTITIONER

## 2023-04-19 PROCEDURE — 73721 MRI JNT OF LWR EXTRE W/O DYE: CPT

## 2023-04-19 PROCEDURE — 99232 SBSQ HOSP IP/OBS MODERATE 35: CPT | Performed by: NURSE PRACTITIONER

## 2023-04-19 PROCEDURE — G0378 HOSPITAL OBSERVATION PER HR: HCPCS

## 2023-04-19 PROCEDURE — 6360000002 HC RX W HCPCS: Performed by: NURSE PRACTITIONER

## 2023-04-19 PROCEDURE — 80048 BASIC METABOLIC PNL TOTAL CA: CPT

## 2023-04-19 RX ADMIN — SODIUM CHLORIDE: 9 INJECTION, SOLUTION INTRAVENOUS at 08:15

## 2023-04-19 RX ADMIN — FENOFIBRATE 145 MG: 145 TABLET, COATED ORAL at 08:12

## 2023-04-19 RX ADMIN — LOVASTATIN 40 MG: 40 TABLET ORAL at 19:57

## 2023-04-19 RX ADMIN — OMEPRAZOLE 20 MG: 20 CAPSULE, DELAYED RELEASE ORAL at 05:23

## 2023-04-19 RX ADMIN — CEFTRIAXONE SODIUM 1000 MG: 1 INJECTION, POWDER, FOR SOLUTION INTRAMUSCULAR; INTRAVENOUS at 08:16

## 2023-04-19 RX ADMIN — TRAMADOL HYDROCHLORIDE 50 MG: 50 TABLET, COATED ORAL at 22:59

## 2023-04-19 RX ADMIN — METOPROLOL SUCCINATE 50 MG: 50 TABLET, EXTENDED RELEASE ORAL at 08:12

## 2023-04-19 RX ADMIN — TRAMADOL HYDROCHLORIDE 50 MG: 50 TABLET, COATED ORAL at 05:22

## 2023-04-19 RX ADMIN — TRAMADOL HYDROCHLORIDE 50 MG: 50 TABLET, COATED ORAL at 18:50

## 2023-04-19 RX ADMIN — SODIUM CHLORIDE, PRESERVATIVE FREE 10 ML: 5 INJECTION INTRAVENOUS at 19:57

## 2023-04-19 RX ADMIN — ASPIRIN 81 MG: 81 TABLET ORAL at 08:12

## 2023-04-19 RX ADMIN — FOLIC ACID 1 MG: 1 TABLET ORAL at 08:12

## 2023-04-19 RX ADMIN — Medication 250 MG: at 08:12

## 2023-04-19 RX ADMIN — TRAMADOL HYDROCHLORIDE 50 MG: 50 TABLET, COATED ORAL at 10:10

## 2023-04-19 RX ADMIN — TRAMADOL HYDROCHLORIDE 50 MG: 50 TABLET, COATED ORAL at 14:42

## 2023-04-19 RX ADMIN — SODIUM CHLORIDE, PRESERVATIVE FREE 10 ML: 5 INJECTION INTRAVENOUS at 08:12

## 2023-04-19 RX ADMIN — THIAMINE HCL TAB 100 MG 100 MG: 100 TAB at 08:12

## 2023-04-19 ASSESSMENT — PAIN DESCRIPTION - ORIENTATION
ORIENTATION: LEFT

## 2023-04-19 ASSESSMENT — PAIN SCALES - GENERAL
PAINLEVEL_OUTOF10: 7
PAINLEVEL_OUTOF10: 7
PAINLEVEL_OUTOF10: 8
PAINLEVEL_OUTOF10: 7

## 2023-04-19 ASSESSMENT — PAIN DESCRIPTION - LOCATION
LOCATION: LEG
LOCATION: KNEE
LOCATION: LEG
LOCATION: LEG

## 2023-04-19 ASSESSMENT — PAIN DESCRIPTION - DESCRIPTORS: DESCRIPTORS: ACHING

## 2023-04-19 NOTE — PLAN OF CARE
Patient rested well during night, tolerated medications well, swelling noted to left knee and right 2nd digit to hand. Patient CT results of left knee sent to Dr. Pari Watts, MRI ordered. Patient updated about MRI. Patient puriwick changed, urine yellow, patient continues on ATB therapy for UTI. Will continue to monitor. MRI database completed. Problem: Discharge Planning  Goal: Discharge to home or other facility with appropriate resources  4/19/2023 0343 by Mendoza Avilez RN  Outcome: Progressing  Flowsheets (Taken 4/18/2023 2000)  Discharge to home or other facility with appropriate resources: Identify barriers to discharge with patient and caregiver     Problem: Safety - Adult  Goal: Free from fall injury  4/19/2023 0343 by Mendoza Avilez RN  Outcome: Progressing     Problem: ABCDS Injury Assessment  Goal: Absence of physical injury  4/19/2023 0343 by Mendoza Avilez RN  Outcome: Progressing     Problem: Skin/Tissue Integrity  Goal: Absence of new skin breakdown  Description: 1. Monitor for areas of redness and/or skin breakdown  2. Assess vascular access sites hourly  3. Every 4-6 hours minimum:  Change oxygen saturation probe site  4. Every 4-6 hours:  If on nasal continuous positive airway pressure, respiratory therapy assess nares and determine need for appliance change or resting period.   4/18/2023 1747 by Pam Thompson RN  Outcome: Progressing     Problem: Pain  Goal: Verbalizes/displays adequate comfort level or baseline comfort level  4/19/2023 0343 by Mendoza Avilez RN  Outcome: Progressing

## 2023-04-19 NOTE — CONSULTS
obvious acute fracture seen. Evaluation limited by osseous demineralization. Findings likely represent an occult acute fracture and if clinically indicated further evaluation could be obtained with MRI. 2. Chronic partially healed fracture of the distal femoral metaphysis. 3. Chronic depressed lateral tibial plateau fracture. XR HIP 2-3 VW W PELVIS LEFT  Result Date: 4/17/2023  Status post remote ORIF of the left femur with no evidence of hardware complication or acute fracture. ASSESSMENT AND PLAN:  Body mass index is 23.39 kg/m². She has left knee pain, with findings concerning for an occult nondisplaced fracture (possibly of the distal femur), sustained on 4/17/2023. She does have underlying chronic deformity of the distal femur and proximal tibia. Notably, she has a complex past medical history. She has a history of COPD, cerebrovascular disease, history of AV block and atrial fibrillation, history of intra-abdominal abscess, history of malnutrition, history of alcoholism. We had a discussion today about the likely diagnosis and its natural history, physical exam and imaging findings, as well as various treatment options in detail. Surgically, we discussed that surgery will most likely not be necessary acutely, but I did recommend advanced imaging to determine exactly what her fracture/injury is, prior to any definitive decision.      -I did recommend ordering a CT scan, which was reviewed, which showed a lipohemarthrosis, suggestive of a periarticular fracture. I did recommend an MRI as well, in order to identify the fracture. -DVT prophylaxis per primary  -pain control  -PT/OT  -The immobilizer for comfort  -NWB with walker (remain nonweightbearing until the MRI has been obtained)      All questions were answered and the above plan was agreed upon.  I will continue to follow the patient while she is in the hospital.       Thom Van MD  Orthopedic Surgery        Please excuse any

## 2023-04-19 NOTE — PLAN OF CARE
Patients pain is being treated with PRN tramadol. MRI of the left leg completed, knee immobilizer ordered from central supply. She is NWB to left leg and is stable for DC per orthopedic surgery with follow up in 1 week   Problem: Safety - Adult  Goal: Free from fall injury  4/19/2023 1557 by Elisa Ramsey RN  Outcome: Progressing     Problem: ABCDS Injury Assessment  Goal: Absence of physical injury  4/19/2023 1557 by Elisa Ramsey RN  Outcome: Progressing     Problem: Skin/Tissue Integrity  Goal: Absence of new skin breakdown  Description: 1. Monitor for areas of redness and/or skin breakdown  2. Assess vascular access sites hourly  3. Every 4-6 hours minimum:  Change oxygen saturation probe site  4. Every 4-6 hours:  If on nasal continuous positive airway pressure, respiratory therapy assess nares and determine need for appliance change or resting period.   4/19/2023 1557 by Elisa Ramsey RN  Outcome: Progressing     Problem: Pain  Goal: Verbalizes/displays adequate comfort level or baseline comfort level  4/19/2023 1557 by Elisa Ramsey RN  Outcome: Progressing

## 2023-04-19 NOTE — DISCHARGE INSTR - COC
Continuity of Care Form    Patient Name: Nilson Flaherty   :  1945  MRN:  [id number]    Admit date:  2023  Discharge date:  23    Code Status Order: DNR-CC   Advance Directives:     Admitting Physician:  Lazarus Marie DO  PCP: Deann Alvarez MD    Discharging Nurse: AUDELIA VELIZ [facility] Unit/Room#: 9189/8465-24  Discharging Unit Phone Number: [phone]    Emergency Contact:   Extended Emergency Contact Information  Primary Emergency Contact: Anne Crowder Phone: [phone]  Relation: Child  Secondary Emergency Contact: Shobha sanabria  Home Phone: [phone]  Relation: Grandchild    Past Surgical History:  Past Surgical History:   Procedure Laterality Date    APPENDECTOMY      HIP SURGERY Left 2022    LEFT HIP  [address] NAIL  - SYNTHES performed by Apollo Hernandez MD at [facility]       Immunization History:   Immunization History   Administered Date(s) Administered    COVID-19, [address] [facility] border, Primary or Immunocompromised, (age 12y+), IM, 100 mcg/0.5mL 2021, 2021, 2021, 2022    COVID-19, PFIZER Bivalent BOOSTER, DO NOT Dilute, (age 12y+), IM, 27 mcg/0.3 mL 2022    Influenza Virus Vaccine 2005, 2011, 11/10/2011, 2013    Influenza, FLUAD, (age 72 y+), Adjuvanted, 0.5mL 2021    Influenza, FLUARIX, FLULAVAL, FLUZONE (age 10 mo+) AND AFLURIA, (age 1 y+), PF, 0.5mL 10/13/2020    Influenza, High Dose (Fluzone 65 yrs and older) 12/10/2015, 2017, 2018, 2019    Pneumococcal, PPSV23, PNEUMOVAX 21, (age 2y+), SC/IM, 0.5mL 10/13/2020    Zoster Live (Zostavax) 2009       Active Problems:  Patient Active Problem List   Diagnosis Code    [facility] left [facility], [facility] S72.002A    Alcoholism ([facility] F10.20    Paroxysmal atrial fibrillation ([facility] I48.0    B12 deficiency anemia D51.9    Cerebrovascular disease I67.9    Closed fracture of transverse process of lumbar vertebra ([facility] S32.009A    COPD

## 2023-04-20 ENCOUNTER — TELEPHONE (OUTPATIENT)
Dept: ORTHOPEDIC SURGERY | Age: 78
End: 2023-04-20

## 2023-04-20 VITALS
WEIGHT: 139.8 LBS | BODY MASS INDEX: 22.47 KG/M2 | HEART RATE: 65 BPM | OXYGEN SATURATION: 100 % | SYSTOLIC BLOOD PRESSURE: 126 MMHG | TEMPERATURE: 98.1 F | HEIGHT: 66 IN | DIASTOLIC BLOOD PRESSURE: 49 MMHG | RESPIRATION RATE: 16 BRPM

## 2023-04-20 DIAGNOSIS — S72.009D HIP FRACTURE, UNSPECIFIED LATERALITY, CLOSED, WITH ROUTINE HEALING, SUBSEQUENT ENCOUNTER: Primary | ICD-10-CM

## 2023-04-20 LAB
ANION GAP SERPL CALCULATED.3IONS-SCNC: 7 MMOL/L (ref 9–17)
BUN SERPL-MCNC: 10 MG/DL (ref 8–23)
BUN/CREAT BLD: 13 (ref 9–20)
CALCIUM SERPL-MCNC: 8.9 MG/DL (ref 8.6–10.4)
CHLORIDE SERPL-SCNC: 99 MMOL/L (ref 98–107)
CO2 SERPL-SCNC: 23 MMOL/L (ref 20–31)
CREAT SERPL-MCNC: 0.77 MG/DL (ref 0.5–0.9)
GFR SERPL CREATININE-BSD FRML MDRD: >60 ML/MIN/1.73M2
GLUCOSE SERPL-MCNC: 121 MG/DL (ref 70–99)
POTASSIUM SERPL-SCNC: 4.6 MMOL/L (ref 3.7–5.3)
SODIUM SERPL-SCNC: 129 MMOL/L (ref 135–144)

## 2023-04-20 PROCEDURE — 36415 COLL VENOUS BLD VENIPUNCTURE: CPT

## 2023-04-20 PROCEDURE — 97110 THERAPEUTIC EXERCISES: CPT

## 2023-04-20 PROCEDURE — 97535 SELF CARE MNGMENT TRAINING: CPT

## 2023-04-20 PROCEDURE — 80048 BASIC METABOLIC PNL TOTAL CA: CPT

## 2023-04-20 PROCEDURE — 99239 HOSP IP/OBS DSCHRG MGMT >30: CPT | Performed by: NURSE PRACTITIONER

## 2023-04-20 PROCEDURE — G0378 HOSPITAL OBSERVATION PER HR: HCPCS

## 2023-04-20 PROCEDURE — 6370000000 HC RX 637 (ALT 250 FOR IP): Performed by: NURSE PRACTITIONER

## 2023-04-20 PROCEDURE — 96366 THER/PROPH/DIAG IV INF ADDON: CPT

## 2023-04-20 PROCEDURE — 2580000003 HC RX 258: Performed by: NURSE PRACTITIONER

## 2023-04-20 PROCEDURE — 97168 OT RE-EVAL EST PLAN CARE: CPT

## 2023-04-20 PROCEDURE — 6360000002 HC RX W HCPCS: Performed by: NURSE PRACTITIONER

## 2023-04-20 PROCEDURE — 97530 THERAPEUTIC ACTIVITIES: CPT

## 2023-04-20 PROCEDURE — 99213 OFFICE O/P EST LOW 20 MIN: CPT | Performed by: ORTHOPAEDIC SURGERY

## 2023-04-20 PROCEDURE — 97164 PT RE-EVAL EST PLAN CARE: CPT

## 2023-04-20 RX ORDER — THIAMINE MONONITRATE (VIT B1) 100 MG
100 TABLET ORAL DAILY
Qty: 30 TABLET | Refills: 3 | Status: SHIPPED | OUTPATIENT
Start: 2023-04-21

## 2023-04-20 RX ORDER — TRAMADOL HYDROCHLORIDE 50 MG/1
50 TABLET ORAL EVERY 4 HOURS PRN
Qty: 21 TABLET | Refills: 0 | Status: SHIPPED | OUTPATIENT
Start: 2023-04-20 | End: 2023-04-24

## 2023-04-20 RX ORDER — LEVOFLOXACIN 250 MG/1
250 TABLET ORAL DAILY
Status: DISCONTINUED | OUTPATIENT
Start: 2023-04-20 | End: 2023-04-20 | Stop reason: HOSPADM

## 2023-04-20 RX ORDER — TRAMADOL HYDROCHLORIDE 50 MG/1
50 TABLET ORAL EVERY 4 HOURS PRN
Qty: 21 TABLET | Refills: 0 | Status: SHIPPED | OUTPATIENT
Start: 2023-04-20 | End: 2023-04-20 | Stop reason: SDUPTHER

## 2023-04-20 RX ORDER — LEVOFLOXACIN 250 MG/1
250 TABLET ORAL DAILY
Qty: 4 TABLET | Refills: 0 | Status: SHIPPED | OUTPATIENT
Start: 2023-04-20 | End: 2023-04-24

## 2023-04-20 RX ADMIN — CEFTRIAXONE SODIUM 1000 MG: 1 INJECTION, POWDER, FOR SOLUTION INTRAMUSCULAR; INTRAVENOUS at 10:08

## 2023-04-20 RX ADMIN — Medication 250 MG: at 08:13

## 2023-04-20 RX ADMIN — FOLIC ACID 1 MG: 1 TABLET ORAL at 08:13

## 2023-04-20 RX ADMIN — METOPROLOL SUCCINATE 50 MG: 50 TABLET, EXTENDED RELEASE ORAL at 09:11

## 2023-04-20 RX ADMIN — SODIUM CHLORIDE: 9 INJECTION, SOLUTION INTRAVENOUS at 10:06

## 2023-04-20 RX ADMIN — TRAMADOL HYDROCHLORIDE 50 MG: 50 TABLET, COATED ORAL at 13:29

## 2023-04-20 RX ADMIN — TRAMADOL HYDROCHLORIDE 50 MG: 50 TABLET, COATED ORAL at 04:53

## 2023-04-20 RX ADMIN — ASPIRIN 81 MG: 81 TABLET ORAL at 08:13

## 2023-04-20 RX ADMIN — LEVOFLOXACIN 250 MG: 250 TABLET, FILM COATED ORAL at 13:53

## 2023-04-20 RX ADMIN — TRAMADOL HYDROCHLORIDE 50 MG: 50 TABLET, COATED ORAL at 09:11

## 2023-04-20 RX ADMIN — OMEPRAZOLE 20 MG: 20 CAPSULE, DELAYED RELEASE ORAL at 06:26

## 2023-04-20 RX ADMIN — SODIUM CHLORIDE, PRESERVATIVE FREE 10 ML: 5 INJECTION INTRAVENOUS at 08:16

## 2023-04-20 RX ADMIN — THIAMINE HCL TAB 100 MG 100 MG: 100 TAB at 08:13

## 2023-04-20 RX ADMIN — FENOFIBRATE 145 MG: 145 TABLET, COATED ORAL at 08:13

## 2023-04-20 ASSESSMENT — PAIN DESCRIPTION - LOCATION
LOCATION: LEG

## 2023-04-20 ASSESSMENT — PAIN DESCRIPTION - ORIENTATION
ORIENTATION: LEFT

## 2023-04-20 ASSESSMENT — PAIN DESCRIPTION - DESCRIPTORS
DESCRIPTORS: STABBING

## 2023-04-20 ASSESSMENT — PAIN SCALES - GENERAL
PAINLEVEL_OUTOF10: 9
PAINLEVEL_OUTOF10: 7
PAINLEVEL_OUTOF10: 4

## 2023-04-20 NOTE — CARE COORDINATION
Social work: If SNF is needed, patient is interested in [facility]. Referral faxed to follow. Will need [facility] vs [facility](currently obs).
Social work: Patient to [employer] to [address] via [place] [facility] at [employer].  # for RN report: [phone]. Completed ROMI faxed to [phone]. Informed RN, pt, and facility of dc time, agreeable to plan. Passar completed.
Social work: [facility] is able to accept. Precert pending.
Social work: jaren obtained for [facility] [employer]. RN informed, dc pending mri of knee. Passar started.
Conversation Outcomes:  ACP discussion completed    Follow-up plan:    [] Schedule follow-up conversation to continue planning  [] Referred individual to Provider for additional questions/concerns   [] Advised patient/agent/surrogate to review completed ACP document and update if needed with changes in condition, patient preferences or care setting    [] This note routed to one or more involved healthcare providers        DNR CC
individualized plan of care/goals and shares the quality data associated with the providers was provided to: Patient   Patient Representative Name:       The Patient and/or Patient Representative Agree with the Discharge Plan?  Yes    Elida Valadez RN  Case Management Department  Ph: [phone] Fax: [phone]

## 2023-04-20 NOTE — DISCHARGE SUMMARY
presents with a fall at home. She normally wears a 4 inch platform shoe on left due to shorter leg, but did not have it on. As she was getting into bed, she turned, missed a step and fell. Tried to catch herself with right hand but ended up also falling on left knee and now cannot get up on it. Her left knee is very swollen. She states she drinks 3 glasses of wine per day, and also had 2 Manhattens at lunch. 4/19 - Very little change in condition. Patient reports continued pain and discomfort. She reports the swelling is essentially unchanged to her left knee. Plan is for MRI of left knee per orthopedics and further treatment to determine by imaging. 4/20 - MRI results reviewed by orthopedics. They advised no plan for surgical intervention this time. They have also advised that effusion drainage would not be beneficial during this phase of the injury. Plan is to discharge to facility for rehab.     Significant therapeutic interventions: As above     Significant Diagnostic Studies:   Labs / Micro:  CBC:   Lab Results   Component Value Date/Time    WBC 6.2 04/17/2023 10:06 PM    RBC 2.98 04/17/2023 10:06 PM    HGB 8.1 04/17/2023 10:06 PM    HCT 24.5 04/17/2023 10:06 PM    MCV 82.4 04/17/2023 10:06 PM    MCH 27.2 04/17/2023 10:06 PM    MCHC 32.9 04/17/2023 10:06 PM    RDW 15.8 04/17/2023 10:06 PM     04/17/2023 10:06 PM     BMP:    Lab Results   Component Value Date/Time    GLUCOSE 121 04/20/2023 05:25 AM     04/20/2023 05:25 AM    K 4.6 04/20/2023 05:25 AM    CL 99 04/20/2023 05:25 AM    CO2 23 04/20/2023 05:25 AM    ANIONGAP 7 04/20/2023 05:25 AM    BUN 10 04/20/2023 05:25 AM    CREATININE 0.77 04/20/2023 05:25 AM    BUNCRER 13 04/20/2023 05:25 AM    CALCIUM 8.9 04/20/2023 05:25 AM    LABGLOM >60 04/20/2023 05:25 AM    GFRAA >60 06/28/2022 05:55 AM    GFR      06/28/2022 05:55 AM        Radiology:  XR HAND RIGHT (MIN 3 VIEWS)    Result Date: 4/17/2023  Low bone density limits

## 2023-04-20 NOTE — TELEPHONE ENCOUNTER
Daughter Mirian called to report she received a call today to schedule an appointment with Dr. Ruby Coffman based off a referral from [facility]. Daughter states mother is admitted at [address] [place] [address] Dr. Mccray Currently and it was her understanding the Dr. Ambrose Mendoza was going to see mother in the hospital. Daughter would like a call back to clarify if this should be out patient or in Patient consult?

## 2023-04-20 NOTE — PLAN OF CARE
Pt remains safe and free from falls thus far in shift. Pt alert and oriented x4. SR w/ 1 degree av block. IV atb. Left knee immobilizer in place. VSS. Possible discharge today. Bed locked and lowest position. Call light in reach. Bed alarm on for safety. Will continue to monitor pt. Problem: Discharge Planning  Goal: Discharge to home or other facility with appropriate resources  4/20/2023 1120 by Teri Ralph RN  Outcome: Progressing     Problem: Safety - Adult  Goal: Free from fall injury  Outcome: Progressing     Problem: ABCDS Injury Assessment  Goal: Absence of physical injury  Outcome: Progressing     Problem: Skin/Tissue Integrity  Goal: Absence of new skin breakdown  Description: 1. Monitor for areas of redness and/or skin breakdown  2. Assess vascular access sites hourly  3. Every 4-6 hours minimum:  Change oxygen saturation probe site  4. Every 4-6 hours:  If on nasal continuous positive airway pressure, respiratory therapy assess nares and determine need for appliance change or resting period.   Outcome: Progressing     Problem: Pain  Goal: Verbalizes/displays adequate comfort level or baseline comfort level  4/20/2023 1120 by Teri Ralph RN  Outcome: Progressing

## 2023-04-21 ENCOUNTER — HOSPITAL ENCOUNTER (OUTPATIENT)
Age: 78
Setting detail: SPECIMEN
Discharge: HOME | End: 2023-04-21

## 2023-04-21 LAB
ABSOLUTE EOS #: 0.17 K/UL (ref 0–0.44)
ABSOLUTE IMMATURE GRANULOCYTE: 0.02 K/UL (ref 0–0.3)
ABSOLUTE LYMPH #: 1.22 K/UL (ref 1.1–3.7)
ABSOLUTE MONO #: 0.59 K/UL (ref 0.1–1.2)
ANION GAP SERPL CALCULATED.3IONS-SCNC: 7 MMOL/L (ref 9–17)
BASOPHILS # BLD: 1 % (ref 0–2)
BASOPHILS ABSOLUTE: 0.03 K/UL (ref 0–0.2)
BUN SERPL-MCNC: 9 MG/DL (ref 8–23)
BUN/CREAT BLD: 11 (ref 9–20)
CALCIUM SERPL-MCNC: 9 MG/DL (ref 8.6–10.4)
CHLORIDE SERPL-SCNC: 99 MMOL/L (ref 98–107)
CO2 SERPL-SCNC: 24 MMOL/L (ref 20–31)
CREAT SERPL-MCNC: 0.8 MG/DL (ref 0.5–0.9)
EOSINOPHILS RELATIVE PERCENT: 3 % (ref 1–4)
GFR SERPL CREATININE-BSD FRML MDRD: >60 ML/MIN/1.73M2
GLUCOSE SERPL-MCNC: 92 MG/DL (ref 70–99)
HCT VFR BLD AUTO: 21 % (ref 36.3–47.1)
HGB BLD-MCNC: 6.3 G/DL (ref 11.9–15.1)
IMMATURE GRANULOCYTES: 0 %
LYMPHOCYTES # BLD: 24 % (ref 24–43)
MCH RBC QN AUTO: 26.6 PG (ref 25.2–33.5)
MCHC RBC AUTO-ENTMCNC: 30 G/DL (ref 28.4–34.8)
MCV RBC AUTO: 88.6 FL (ref 82.6–102.9)
MONOCYTES # BLD: 11 % (ref 3–12)
NRBC AUTOMATED: 0 PER 100 WBC
PDW BLD-RTO: 15.9 % (ref 11.8–14.4)
PLATELET # BLD AUTO: 248 K/UL (ref 138–453)
PMV BLD AUTO: 9 FL (ref 8.1–13.5)
POTASSIUM SERPL-SCNC: 4.5 MMOL/L (ref 3.7–5.3)
RBC # BLD: 2.37 M/UL (ref 3.95–5.11)
RBC # BLD: ABNORMAL 10*6/UL
SEG NEUTROPHILS: 61 % (ref 36–65)
SEGMENTED NEUTROPHILS ABSOLUTE COUNT: 3.14 K/UL (ref 1.5–8.1)
SODIUM SERPL-SCNC: 130 MMOL/L (ref 135–144)
WBC # BLD AUTO: 5.2 K/UL (ref 3.5–11.3)

## 2023-04-21 PROCEDURE — 85025 COMPLETE CBC W/AUTO DIFF WBC: CPT

## 2023-04-21 PROCEDURE — P9603 ONE-WAY ALLOW PRORATED MILES: HCPCS

## 2023-04-21 PROCEDURE — 80048 BASIC METABOLIC PNL TOTAL CA: CPT

## 2023-04-21 PROCEDURE — 36415 COLL VENOUS BLD VENIPUNCTURE: CPT

## 2023-04-21 NOTE — TELEPHONE ENCOUNTER
Spoke with patients daughter. Stated that I spoke with Dr. Rehan Hebert, and she needs to follow up with Dr. Tri Pisano for the distal femur fx. Patients daughter understood and will contact their office.

## 2023-04-21 NOTE — TELEPHONE ENCOUNTER
I spoke with Joshua Ken (patients daughter). She is very confused as to what is going on with her mom. They sent her mom home, and is now at [facility]. She says that she would like to know what is going on, if something is broken, etc. She wants to know are you the one following her mom, or should she be waiting from someone from Dr. Yeny Gonzalez office to call to schedule an appointment, or should she call? She is very confused and frustrated at this process. She asked if I could send a message to you to get clarification so she knows what to do for her mom. Please advise.

## 2023-04-22 PROBLEM — S89.92XA INJURY OF LEFT KNEE: Status: ACTIVE | Noted: 2023-04-22

## 2023-04-22 NOTE — PROGRESS NOTES
[place]  PROGRESS NOTE    Room # 4505/0485-13   Name: Zaida Poe              Reason for visit: Routine    I visited the patient. Admit Date & Time: 4/17/2023  9:56 PM    Assessment:  Zaida Poe is a 76 y.o. female.  consult for POA, PT: states will speak to her daughter Guero Christy. Upon entering the room patient states about their medical condition, states struggles with their medical situation. States worries, fears frustrations. Patient states well , treated well. Patient states good family support, shares about spiritual life, Scientologist background, shares Scientologist beliefs. Patient shares about outside interests. Intervention:   provided a ministry presence, listening ,prayer and communion. Outcome:  Patient open to visit. Plan:  Chaplains will remain available to offer spiritual and emotional support as needed. Electronically signed by Chaplain Prashant, on 4/18/2023 at 2:40 PM.  Julio      04/18/23 1432   Encounter Summary   Service Provided For: Patient   Referral/Consult From: [facility]   Support System Children   Last Encounter  04/18/23   Complexity of Encounter Moderate   Begin Time 0130   End Time  0200   Total Time Calculated 30 min   Encounter    Type Initial Screen/Assessment   Grief, Loss, and Adjustments   Type Grief and loss; Life Adjustments; Adjustment to illness   Advance Care Planning   Type ACP conversation   Assessment/Intervention/Outcome   Assessment Calm   Intervention Active listening;Discussed belief system/Scientologist practices/julia;Discussed illness injury and its impact;Prayer (assurance of)/[place];Sustaining Presence/Ministry of presence   Outcome Engaged in conversation;Expressed feelings, needs, and concerns;Expressed Gratitude;Receptive
Called CT about stat CT ordered. They are unsure when they can get to it and will call me back.
Her left knee MRI was reviewed, both images and report as below:    1. Acute on chronic fracture of the distal femur. The acute component   involves the superior margin of the medial femoral condyle. Background of   chronic malunion. 2. Chronic appearing subchondral insufficiency fracture of the lateral tibial   plateau with prominent subcortical depression. 3. Background of osteoarthritis with meniscal tears, could be posttraumatic   in nature. 4. Large joint effusion. 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 The MRI does show what her CT was suggestive of, that she has a fracture at the medial aspect of the distal femur. I communicated with the floor nurse on 4/19/2023 that I recommend the patient remain nonweightbearing, as I discussed with the patient, and I also recommended a knee immobilizer at this time. This was also communicated on 4/20/2023 with Tucker Dickens of [facility]. Given the complexity of her issue, I have referred the patient to my orthopedic trauma colleague, Dr. Bryanna Soto. I have spoken with my office, and asked them to help arrange her outpatient follow-up and logistics with Dr. Bryanna Soto.
No distress this morning, after receiving PRN Ultram for left knee pain & swelling. Will monitor.
Nurse attempted to call and give report and no answer.
Nurse attempted to call and give report and still no answer
Nurse attempted to call and give report and still no answer.
Nurse called report to Blank Kong who will be taking care of patient at [facility]. All questions were answered.
Occupational Therapy  Facility/Department: [facility] PROGRESSIVE CARE  Occupational Therapy Initial Assessment    OTTO VALERIO reports patient is medically stable for therapy treatment this date. Chart reviewed prior to treatment and patient is agreeable for therapy. All lines intact and patient positioned comfortably at end of treatment. All patient needs addressed prior to ending therapy session. Pt currently functioning below baseline. Recommend daily inpatient skilled therapy at time of discharge to maximize long term outcomes and prevent re-admission. Please refer to AM-PAC score for current level of function. Name: Yaima Mcgraw  : 1945  MRN: [id number]  Date of Service: 2023    Discharge Recommendations:  Patient would benefit from continued therapy after discharge  OT Equipment Recommendations  Equipment Needed: Yes  Mobility Devices: ADL Assistive Devices  ADL Assistive Devices: Sock-Aid Soft;Long-handled Shoe Horn;Long-handled Sponge       Patient Diagnosis(es): The primary encounter diagnosis was Injury of left knee, initial encounter. Diagnoses of Fall, initial encounter, Acute alcoholic intoxication without complication ([address] and Unable to ambulate were also pertinent to this visit. Past Medical History:  has a past medical history of Atrial fibrillation ([address] and HTN (hypertension). Past Surgical History:  has a past surgical history that includes Appendectomy and hip surgery (Left, 2022). PER H&P:    Yaima Mcgraw is a 76 y.o. Non- / non  female who presents with Fall and Hip Pain   and is admitted to the hospital for the management of Unable to ambulate. Patient says she had some wine last evening and when she was going to bed, she lost her balance while using her walker and fell. She denies hitting her head, denies syncope.  She drinks 3 glasses of wine every evening, has a hx of  left leg fractures, left knee surgery, atrial fib and HTN      Assessment
Occupational Therapy  Facility/Department: [facility] PROGRESSIVE CARE  Occupational Therapy Re-Assessment    Name: Jens Chávez  : 1945  MRN: [id number]  Date of Service: 2023    Discharge Recommendations:  Patient would benefit from continued therapy after discharge  OT Equipment Recommendations  Equipment Needed: Yes  Mobility Devices: ADL Assistive Devices  ADL Assistive Devices: Sock-Aid Soft;Long-handled Shoe Horn;Long-handled Sponge;Reacher     Pt currently functioning below baseline. Recommend daily inpatient skilled therapy at time of discharge to maximize long term outcomes and prevent re-admission. Please refer to AM-PAC score for current level of function. RN reports patient is medically stable for therapy treatment this date. Chart reviewed prior to treatment and patient is agreeable for therapy. All lines intact and patient positioned comfortably at end of treatment. All patient needs addressed prior to ending therapy session. Patient Diagnosis(es): The primary encounter diagnosis was Injury of left knee, initial encounter. Diagnoses of Fall, initial encounter, Acute alcoholic intoxication without complication ([address] and Unable to ambulate were also pertinent to this visit. Past Medical History:  has a past medical history of Atrial fibrillation ([address] and HTN (hypertension). Past Surgical History:  has a past surgical history that includes Appendectomy and hip surgery (Left, 2022). Pt presented to ED on 23   via EMS after a fall. Patient reportedly drank a half a bottle of wine and stumbled and fell. Sounds mechanical in nature. EMS reports her only complaint is left hip pain. Sound like she has had hip fractures in the past with prior ORIF's. She has chronic left leg shortening. Range of motion and movement of the left hip region causes pain.   Patient reports no pain to her right hand however does have some ecchymosis to her second digit at the PIP joint with
Patient admitted from [facility] ED via stretcher to Progressive Care Unit, room 1018. Patient's left knee is swollen, and bruising & abrasion to left index finger & hand. Waffle mattress placed on bed. Transferred from stretcher to bed by x2 transport. Purewick replaced & connected to wall suction. Side rails raised x2 & bed alarm activated for patient safety. Vital signs taken. Placed on heart monitor. Admission database completed. Will monitor.
Physical Therapy  DATE: 2023    NAME: Liudmila Lopez  MRN: [id number]   : 1945    Patient not seen this date for Physical Therapy due to:      [] Cancel by RN or physician due to:    [] Hemodialysis    [] Critical Lab Value Level     [] Blood transfusion in progress    [] Acute or unstable cardiovascular status   _MAP < 55 or more than >115  _HR < 40 or > 130    [] Acute or unstable pulmonary status   -FiO2 > 60%   _RR < 5 or >40    _O2 sats < 85%    [] Strict Bedrest    [] Off Unit for surgery or procedure    [] Off Unit for testing       [] Pending imaging to R/O fracture    [x] Refusal by Patient (Patient Mika Jon is too painful and swollen for any mobility, patient crying out while OT attempting to put sock on Lt foot. Hold PT today, Patient waiting for MRI of LT knee ordered by Dr. Lisbeth Conteh, considered [address] Lt LE at this time per RN.)      [] Other      [] PT being discontinued at this time. Patient independent. No further needs. [] PT being discontinued at this time as the patient has been transferred to hospice care. No further needs.       Charolett Shirts, PTA
Pt discharged to [facility]. All belongings were accounted for and home medications given to pt. Ivs and cardiac monitor were removed. Report already called to facility.
Results of CT sent to ortho, Dr. Lund Members called back and ordered MRI without contrast. Order placed.
Talked with patient's daughter Monika Olmos. Updated her on patient and requested if she could bring in patient's home medication in the prescription bottles. She said she would be able to do that at some point today.
Transitions of Care Pharmacy Service   Medication Review    The patient's list of current home medications has been reviewed. Source(s) of information: Patient/ medication bottles    Based on information provided by the above source(s), I have updated the patient's home med list as described below. Please review the ACTION REQUESTED section of this note below for any discrepancies on current hospital orders. I changed or updated the following medications on the patient's home medication list:  Removed none     Added none     Adjusted   Magox 400mg to magox 250mg PO daily  Celexa 20mg to 10mg PO daily   Other Notes Pt gets her b-12 shot at home monthly given by her daughter         PROVIDER ACTION REQUESTED  Medications that need to be addressed by a physician/nurse practitioner:    Medication Action Requested   Colace 100mg     Pt uses daily please order as appropriate         Please feel free to call me with any questions about this encounter. Thank you.     Kirsten Schroeder, [facility]   Transitions of Care Pharmacy Service  Phone:  [phone]  Fax: [phone]      Electronically signed by Kirsten Schroeder [facility] on 4/18/2023 at 3:51 PM           Medications Prior to Admission: citalopram (CELEXA) 10 MG tablet, Take 1 tablet by mouth daily (with breakfast)  Magnesium Oxide (MAGNESIUM-OXIDE) 250 MG TABS tablet, Take 1 tablet by mouth daily  ELIQUIS 5 MG TABS tablet, Take 5 mg by mouth 2 times daily   aspirin 81 MG EC tablet, Take 81 mg by mouth daily  cyanocobalamin 1000 MCG/ML injection, Inject 1,000 mcg into the muscle every 30 days Indications: on the 6th of the month   docusate (COLACE, DULCOLAX) 100 MG CAPS, Take 100 mg by mouth daily Indications: instructed to use daily due to her ileostomy  fenofibrate (TRICOR) 145 MG tablet, Take 145 mg by mouth daily  lovastatin (MEVACOR) 40 MG tablet, Take 40 mg by mouth nightly   omeprazole (PRILOSEC) 20 MG delayed release capsule, Take 20 mg by mouth daily  metoprolol
Writer attempted 3x to try and give report and no answer. Will continue to try and get a hold of someone.
Atrial fibrillation ([facility] [address] [facility] and HTN (hypertension). Past Surgical History:  has a past surgical history that includes Appendectomy and hip surgery (Left, 6/24/2022). Assessment   Body Structures, Functions, Activity Limitations Requiring Skilled Therapeutic Intervention: Decreased functional mobility ; Decreased ROM; Decreased ADL status; Decreased strength;Decreased safe awareness;Decreased endurance;Decreased posture; Increased pain;Decreased balance;Decreased sensation;Decreased coordination;Decreased high-level IADLs  Assessment: Reassessment performed this date to address mobility w/ LLE NWB & update goals/POC. Pt tolerated session fair. Pt requiring max encouragement to attempt mobility & currently requires skilled 2 person assist for safe mobility. At this time, pt most limited by increased pain. Pt would benefit from continued skilled PT to address deficits in order to maximize independence w/ functional mobility and return to PLOF as able. Therapy Prognosis: Good  Decision Making: High Complexity  Requires PT Follow-Up: Yes  Activity Tolerance  Activity Tolerance: Patient limited by pain  Activity Tolerance Comments: Pt requiring max encouragement to attempt all mobility this date however reporting feeling \"okay\" at end of session. Pt reporting pain,  Cady Bryan notified.      Plan   Physcial Therapy Plan  General Plan: 5-7 times per week  Current Treatment Recommendations: Strengthening, Balance training, Functional mobility training, Transfer training, ADL/Self-care training, Endurance training, Gait training, Neuromuscular re-education, Safety education & training, Home exercise program, Patient/Caregiver education & training, Positioning, Therapeutic activities, Equipment evaluation, education, & procurement, Pain management  Safety Devices  Type of Devices: Bed alarm in place, Call light within reach, Left in bed, Gait belt, Nurse notified, All jeancarlos prominences offloaded  Restraints  Restraints
reports that she quit smoking about 40 years ago. Her smoking use included cigarettes. She has never used smokeless tobacco. She reports current alcohol use of about 21.0 standard drinks per week. She reports that she does not use drugs. Family History:   Family History   Problem Relation Age of Onset    Heart Disease Mother     Cancer Father     Hypertension Sister     Stroke Sister        Vitals:  BP (!) 134/59   Pulse 64   Temp 98.1 °F (36.7 °C) (Oral)   Resp 18   Ht 5' 6\" (1.676 m)   Wt 144 lb 14.4 oz (65.7 kg)   SpO2 98%   BMI 23.39 kg/m²   Temp (24hrs), Av.3 °F (36.8 °C), Min:98.1 °F (36.7 °C), Max:98.8 °F (37.1 °C)    No results for input(s): POCGLU in the last 72 hours. I/O (24Hr):     Intake/Output Summary (Last 24 hours) at 2023 1145  Last data filed at 2023 0000  Gross per 24 hour   Intake --   Output 700 ml   Net -700 ml       Labs:  Hematology:  Recent Labs     23   WBC 6.2   RBC 2.98*   HGB 8.1*   HCT 24.5*   MCV 82.4   MCH 27.2   MCHC 32.9   RDW 15.8*      MPV 6.1   INR 1.1     Chemistry:  Recent Labs     236 23  0009 23  0520 23  0517   *  --  131* 131*   K 4.2  --  4.7 4.4   CL 96*  --  101 100   CO2 21  --   23   GLUCOSE 111*  --  98 131*   BUN 15  --  13 11   CREATININE 1.10*  --  0.97* 0.94*   MG 1.9  --   --   --    ANIONGAP 11  --  9 8*   LABGLOM 51*  --  59* >60   CALCIUM 9.1  --  8.6 9.0   TROPHS 8 8  --   --      Recent Labs     23   PROT 6.3*   LABALBU 4.0   AST 14   ALT 8   ALKPHOS 32*   BILITOT 0.2*     ABG:No results found for: POCPH, PHART, PH, POCPCO2, PDI9MKE, PCO2, POCPO2, PO2ART, PO2, POCHCO3, ARH7FBS, HCO3, NBEA, PBEA, BEART, BE, THGBART, THB, EWM4ZWK, MVRN2RKB, F7LYGKNL, O2SAT, FIO2  No results found for: SPECIAL  Lab Results   Component Value Date/Time    CULTURE GRAM NEGATIVE RODS >805094 CFU/ML (A) 2023 03:40 AM       Radiology:  XR HAND RIGHT (MIN 3 VIEWS)    Result Date:
assist of 1 and use of AE/AD as needed. Short Term Goal 4: ADL transfers and functional mob with AD to min assist.  Short Term Goal 5: toileting tasks with use of BSC/AD and grab bar to mod assist of 1. Long Term Goals  Long Term Goal 1: Pt to stand with CG and AD rafael > 5 mins as able to reduce falls with functional tasks. Long Term Goal 2: Pt/caregivers to be I with pressure relief, EC/WS and fall prevention tech, DME/AE recommendations with use of handouts.  (handouts issued)    AM-PAC Score        AM-[facility] Inpatient Daily Activity Raw Score: 13 (04/19/23 1203)  AM-PAC Inpatient ADL T-Scale Score : 32.03 (04/19/23 1203)  ADL Inpatient CMS 0-100% Score: 63.03 (04/19/23 1203)  ADL Inpatient CMS G-Code Modifier : CL (04/19/23 1203)      Therapy Time   Individual Concurrent Group Co-treatment   Time In 1045         Time Out 1110         Minutes [address] [place] [address]
discuss further treatment options.    Plan for discharge to SNF as soon as arrangements can make  Acute cystitis  Positive for Klebsiella  Transition to oral Levaquin 250 mg for 5 days  Paroxysmal A-fib on long-term anticoagulation, secondary hypocoagulable state with a known history of essential hypertension  Continue Eliquis, aspirin, Tricor, ACE, metoprolol    WISAM Madrigal NP  4/20/2023  12:53 PM
balance & endurance training, pressure relief with Pt able to demonstrate effective pressure relief techniques, fall prevention, & issue written Pt Ed  Patient Goals   Patient Goals : return to walking indep with my R/walker       Education  Patient Education  Education Given To: Patient  Education Provided: Role of Therapy;Transfer Training;Equipment;Plan of Care;Energy Conservation; Fall Prevention Strategies;Precautions  Education Provided Comments: Pt educated on purpose of PT eval, importance of continued mobility throughout admission, safety awareness, fall risk prevention, safe transfers & ambulation w/ RW, circulation ex's, pressure relief/optimal breathing techniques, prevention of sedentary complications, and PT POC. Pt demonstrated FAIR carryover  Pt requires continued reinforcement of education. Education Method: Demonstration;Verbal  Education Outcome: Continued education needed      Therapy Time   Individual Concurrent Group Co-treatment   Time In 56         Time Out 1146         Minutes 68             Additional 10 minutes for chart review    Treatment time: 70 minutes  Co-treatment with OT warranted secondary to decreased safety and independence requiring 2 skilled therapy professionals to address individual discipline's goals. PT addressing core control in transitions with bed mobility & transfers, seated/standing posture, pressure relief, seated & standing postural alignment and breathing techniques, safety/scanning, & fall prevention in ambulation techniques.         [address], PT

## 2023-04-24 ENCOUNTER — TELEPHONE (OUTPATIENT)
Dept: ORTHOPEDIC SURGERY | Age: 78
End: 2023-04-24

## 2023-04-24 NOTE — TELEPHONE ENCOUNTER
Daughter Karissa Branham called back to schedule appointment for patient. I scheduled next opening on 4/27 ref 'd by Dr. Korey Weiss for Hip fracture, unspecified laterality, closed, with routine healing, Is follow up within appropriate time frame? Daughter has to arrange transportation from [address] [place] [address].

## 2023-04-26 ENCOUNTER — HOSPITAL ENCOUNTER (OUTPATIENT)
Age: 78
Setting detail: SPECIMEN
Discharge: HOME | End: 2023-04-26

## 2023-04-26 LAB
HCT VFR BLD AUTO: 22.8 % (ref 36.3–47.1)
HGB BLD-MCNC: 6.9 G/DL (ref 11.9–15.1)

## 2023-04-26 PROCEDURE — 85018 HEMOGLOBIN: CPT

## 2023-04-26 PROCEDURE — 36415 COLL VENOUS BLD VENIPUNCTURE: CPT

## 2023-04-26 PROCEDURE — 85014 HEMATOCRIT: CPT

## 2023-04-26 PROCEDURE — P9603 ONE-WAY ALLOW PRORATED MILES: HCPCS

## 2023-05-02 ENCOUNTER — OFFICE VISIT (OUTPATIENT)
Dept: ORTHOPEDIC SURGERY | Age: 78
End: 2023-05-02

## 2023-05-02 VITALS — BODY MASS INDEX: 22.34 KG/M2 | HEIGHT: 66 IN | WEIGHT: 139 LBS

## 2023-05-02 DIAGNOSIS — M25.572 LEFT ANKLE PAIN, UNSPECIFIED CHRONICITY: Primary | ICD-10-CM

## 2023-05-04 NOTE — PROGRESS NOTES
[address] [facility] ORTHO SPECIALISTS  [facility] [address]  Dept: [phone]    Orthopaedic New Patient      CHIEF COMPLAINT:    Chief Complaint   Patient presents with    Follow-up     Left leg/femur       HISTORY OF PRESENT ILLNESS:    The patient is a 76 y.o. female who is being seen as a new patient for left knee medial plateau fracture. Patient has had multiple distal femur and proximal tibia fractures with multiple failed attempts at ORIF and circular external fixator. Patient was most recently evaluated at an outside facility where she received a left knee x-ray which was concerning for potential occult fracture. She subsequently received a CT knee which was unable to determine the chronicity of the left knee fractures. An MRI was obtained which demonstrated the significant extent of the prior fractures and subsequent healing. At that time a depressed medial plateau fracture could be localized. Patient placed in a knee immobilizer. She was referred to our office for definitive treatment. Patient is seen and evaluated with daughter and EMS personnel in the room. [place] Per the patient on 4/17/23 she sustained a mechanical fall after consuming multiple alcoholic beverages. She states the majority of the pain is located in the knee, ankle and foot. The patient has an extensive alcohol history and states she drinks approximately 1 bottle of wine per night and occasionally drinks hard alcohol. She is a non-smoker. At baseline patient ambulates approximately 30 to 40 feet with a rolling walker. She has a 4 inch leg length discrepancy due to the chronic fractures. Per patient her first left distal femur fracture was 18 years ago which was treated unsuccessfully with ORIF. She sustained a second fracture of the left distal femur in 2010 however mechanism is not clear.  She has a prior

## 2023-05-16 ENCOUNTER — TELEPHONE (OUTPATIENT)
Dept: ORTHOPEDIC SURGERY | Age: 78
End: 2023-05-16

## 2023-05-16 NOTE — TELEPHONE ENCOUNTER
Spent considerable amount of time attempting to contact pts nurse on staff to inquiring WHY pt is being discharge 5/18/23 (per her daughter) line rang with no answer and no voicemail available to leave a CB #. Called facility three times with no answer.

## 2023-05-30 ENCOUNTER — OFFICE VISIT (OUTPATIENT)
Dept: ORTHOPEDIC SURGERY | Age: 78
End: 2023-05-30

## 2023-05-30 VITALS — WEIGHT: 139 LBS | HEIGHT: 66 IN | BODY MASS INDEX: 22.34 KG/M2

## 2023-05-30 DIAGNOSIS — R52 PAIN: Primary | ICD-10-CM

## 2023-05-30 NOTE — PROGRESS NOTES
[facility] SPECIALISTS  [facility] [address]  Dept: [phone]  Dept Fax: [phone]        Orthopaedic Trauma Clinic Follow Up      Subjective:   Date of injury: 4/17/2023 -left nondisplaced lateral tibial plateau fracture    Tere Penn is a [de-identified]y.o. year old female who presents to the clinic today 6 weeks after a fall from standing height where she sustained a nondisplaced lateral tibial plateau fracture. She was last seen in our clinic on 5/2/2023. At that time it was decided to pursue nonoperative treatment with a knee immobilizer and a plan to keep the patient nonweightbearing. Since that time she has been compliant with her nonweightbearing status. She has maintained her knee immobilizer. Her pain is minimal with no need for oral pain medications currently. She denies any new trauma or falls since we last saw her. She is still in a facility. The daughter states that she will be discharged from the facility in 2 days and had home. Daughter has questions and concerns returning home nonweightbearing. She denies any new numbness or tingling radiating down her left lower extremity. Review of Systems  Gen: no fever, chills, malaise  CV: no chest pain or palpitations  Resp: no cough or shortness of breath  GI: no nausea, vomiting, diarrhea, or constipation  Neuro: no numbness, tingling, or weakness  Msk: Negative for arthralgias and myalgias of the left lower extremity  10 remaining systems reviewed and negative    Objective : There were no vitals filed for this visit. Body mass index is 22.44 kg/m². General: No acute distress, resting comfortably in the clinic  Neuro: alert. oriented  Eyes: Extra-ocular muscles intact  Pulm: Respirations unlabored and regular. Skin: warm, well perfused  Psych:   Patient has good fund of knowledge and displays understanging of exam, diagnosis, and plan.     MSK:    Left lower

## (undated) DEVICE — DRESSING PETRO W3XL8IN OIL EMUL N ADH GZ KNIT IMPREG CELOS

## (undated) DEVICE — BLANKET WRM W29.9XL79.1IN UP BODY FORC AIR MISTRAL-AIR

## (undated) DEVICE — Device

## (undated) DEVICE — C-ARM: Brand: UNBRANDED

## (undated) DEVICE — PADDING UNDERCAST W6INXL4YD WYTEX 6 PER BG

## (undated) DEVICE — GAUZE,SPONGE,FLUFF,6"X6.75",STRL,5/TRAY: Brand: MEDLINE

## (undated) DEVICE — GLOVE SURG SZ 8 CRM LTX FREE POLYISOPRENE POLYMER BEAD ANTI

## (undated) DEVICE — 3M™ TEGADERM™ CHG DRESSING 25/CARTON 4 CARTONS/CASE 1659: Brand: TEGADERM™

## (undated) DEVICE — SUTURE VCRL SZ 2-0 L27IN ABSRB UD L26MM CT-2 1/2 CIR J269H

## (undated) DEVICE — SPONGE LAP W18XL18IN WHT COT 4 PLY FLD STRUNG RADPQ DISP ST

## (undated) DEVICE — GLOVE SURG SZ 85 L12IN FNGR THK79MIL GRN LTX FREE

## (undated) DEVICE — SUTURE VCRL SZ 0 L27IN ABSRB UD L26MM CT-2 1/2 CIR J270H

## (undated) DEVICE — SMARTGOWN SURGICAL GOWN, 3XL, LONG: Brand: CONVERTORS

## (undated) DEVICE — LIMB HOLDER, QUICK RELEASE, 60" STRAP: Brand: MEDLINE

## (undated) DEVICE — DRESSING TRNSPAR W5XL4.5IN FLM SHT SEMIPERMEABLE WIND

## (undated) DEVICE — GUIDEWIRE ORTH L400MM DIA3.2MM FOR TFN

## (undated) DEVICE — 6619 2 PTNT ISO SYS INCISE AREA&LT;(&GT;&&LT;)&GT;P: Brand: STERI-DRAPE™ IOBAN™ 2

## (undated) DEVICE — C-ARMOR C-ARM EQUIPMENT COVERS CLEAR STERILE UNIVERSAL FIT 12 PER CASE: Brand: C-ARMOR

## (undated) DEVICE — STERILE PATIENT PROTECTIVE PAD FOR IMP® KNEE POSITIONERS & COHESIVE WRAP (10 / CASE): Brand: DE MAYO KNEE POSITIONER®

## (undated) DEVICE — YANKAUER,FLEXIBLE HANDLE,REGLR CAPACITY: Brand: MEDLINE INDUSTRIES, INC.

## (undated) DEVICE — SURGICAL PROCEDURE KIT BASIN MAJ SET UP

## (undated) DEVICE — APPLICATOR MEDICATED 26 CC SOLUTION HI LT ORNG CHLORAPREP

## (undated) DEVICE — BIT DRL L330MM DIA4.2MM CALIB 100MM 3 FLUT QUIK CPL